# Patient Record
Sex: MALE | Race: WHITE | Employment: FULL TIME | URBAN - METROPOLITAN AREA
[De-identification: names, ages, dates, MRNs, and addresses within clinical notes are randomized per-mention and may not be internally consistent; named-entity substitution may affect disease eponyms.]

---

## 2017-09-18 ENCOUNTER — OFFICE VISIT (OUTPATIENT)
Dept: INTERNAL MEDICINE CLINIC | Age: 62
End: 2017-09-18

## 2017-09-18 VITALS
SYSTOLIC BLOOD PRESSURE: 138 MMHG | BODY MASS INDEX: 25.01 KG/M2 | HEIGHT: 68 IN | HEART RATE: 64 BPM | OXYGEN SATURATION: 96 % | WEIGHT: 165 LBS | TEMPERATURE: 98.1 F | DIASTOLIC BLOOD PRESSURE: 80 MMHG

## 2017-09-18 DIAGNOSIS — E78.5 DYSLIPIDEMIA: ICD-10-CM

## 2017-09-18 DIAGNOSIS — M25.50 POLYARTHRALGIA: Primary | ICD-10-CM

## 2017-09-18 LAB
ALBUMIN SERPL-MCNC: 3.8 G/DL (ref 3.9–5.4)
ALKALINE PHOS POC: 129 U/L (ref 38–126)
ALT SERPL-CCNC: 24 U/L (ref 9–52)
AST SERPL-CCNC: 19 U/L (ref 14–36)
BUN BLD-MCNC: 18 MG/DL (ref 9–20)
CALCIUM BLD-MCNC: 9.4 MG/DL (ref 8.4–10.2)
CHLORIDE BLD-SCNC: 103 MMOL/L (ref 98–107)
CK (CPK) POC: 52 U/L (ref 30–135)
CO2 POC: 27 MMOL/L (ref 22–32)
CREAT BLD-MCNC: 0.8 MG/DL (ref 0.8–1.5)
EGFR (POC): 94.4
ERYTHROCYTE [SEDIMENTATION RATE] IN BLOOD: 35 MM/HR (ref 0–10)
GLUCOSE POC: 89 MG/DL (ref 75–110)
POTASSIUM SERPL-SCNC: 4.5 MMOL/L (ref 3.6–5)
PROT SERPL-MCNC: 6.6 G/DL (ref 6.3–8.2)
SODIUM SERPL-SCNC: 142 MMOL/L (ref 137–145)
TOTAL BILIRUBIN POC: 0.6 MG/DL (ref 0.2–1.3)

## 2017-09-18 NOTE — PROGRESS NOTES
1. Have you been to the ER, urgent care clinic since your last visit? Hospitalized since your last visit? No    2. Have you seen or consulted any other health care providers outside of the [address] [place] [address] since your last visit? Include any pap smears or colon screening.  No

## 2017-09-18 NOTE — PROGRESS NOTES
Johnathan Taylor comes to the office today complaining of about 3 1/2 weeks of severe pain in both shoulders and both hips, seems to be worse at night and keeps him awake. He says if he takes a hot shower and takes an Oxycodone he got five years ago with dental surgery then he gets relief. It doesn't limit him in any way during the day. Medications:  1. Plavix 75 mg daily. 2. Motrin 400 mg as needed. 3. Crestor 10 mg daily. 4. Flomax 0.4 mg daily. Physical Examination:  GENERAL:  T: 98.1. BP: 138/80. P: 64 and regular. WT: 165. MUSCULOSKELETAL:  He has good motion at the shoulders. He does have more discomfort at the right shoulder than the left, but full ROM. There is no discomfort with palpation over the trochanteric bursa or with rotation at the hip joint. Impression:  1. Symmetric polyarthralgia, etiology undetermined. Plan:  1. Discontinue Crestor temporarily. 2. Check CMP, CPK, sed rate, CRP and RA latex. I'll call with results, make further recommendations and arrange follow up if necessary.

## 2017-09-19 LAB
CRP SERPL-MCNC: 31 MG/L (ref 0–4.9)
RHEUMATOID FACT SERPL-ACNC: <10 IU/ML (ref 0–13.9)

## 2017-09-20 DIAGNOSIS — M35.3 PMR (POLYMYALGIA RHEUMATICA) (HCC): Primary | ICD-10-CM

## 2017-09-20 RX ORDER — PREDNISONE 10 MG/1
10 TABLET ORAL SEE ADMIN INSTRUCTIONS
Qty: 21 TAB | Refills: 0 | Status: SHIPPED | OUTPATIENT
Start: 2017-09-20 | End: 2017-10-03

## 2017-09-20 NOTE — PROGRESS NOTES
cpk & lfts nl-resume crestor    Crp=31;esr=35    REC:prednisone dose pack;report first of next week regarding symptoms

## 2017-09-27 RX ORDER — PREDNISONE 10 MG/1
TABLET ORAL
Qty: 60 TAB | Refills: 0 | Status: SHIPPED | OUTPATIENT
Start: 2017-09-27 | End: 2017-10-23 | Stop reason: SDUPTHER

## 2017-09-27 NOTE — TELEPHONE ENCOUNTER
Requested Prescriptions     Pending Prescriptions Disp Refills    predniSONE (DELTASONE) 10 mg tablet 60 Tab 0     Sig: Take 2 tablets

## 2017-10-03 PROBLEM — J44.9 COPD (CHRONIC OBSTRUCTIVE PULMONARY DISEASE) (HCC): Status: ACTIVE | Noted: 2017-10-03

## 2017-10-03 PROBLEM — I10 HYPERTENSION: Status: ACTIVE | Noted: 2017-10-03

## 2017-10-03 PROBLEM — N52.9 ED (ERECTILE DYSFUNCTION): Status: ACTIVE | Noted: 2017-10-03

## 2017-10-03 PROBLEM — B00.1 FEVER BLISTER: Status: ACTIVE | Noted: 2017-10-03

## 2017-10-03 PROBLEM — Z87.442 HISTORY OF KIDNEY STONES: Status: ACTIVE | Noted: 2017-10-03

## 2017-10-03 PROBLEM — I25.10 CAD, MULTIPLE VESSEL: Status: ACTIVE | Noted: 2017-10-03

## 2017-10-03 PROBLEM — Z86.73 HISTORY OF CVA (CEREBROVASCULAR ACCIDENT): Status: ACTIVE | Noted: 2017-10-03

## 2017-10-03 PROBLEM — Z00.00 PE (PHYSICAL EXAM), ANNUAL: Status: ACTIVE | Noted: 2017-10-03

## 2017-10-03 PROBLEM — Z72.0 TOBACCO ABUSE DISORDER: Status: ACTIVE | Noted: 2017-10-03

## 2017-10-03 PROBLEM — E55.9 VITAMIN D DEFICIENCY: Status: ACTIVE | Noted: 2017-10-03

## 2017-10-03 RX ORDER — ASPIRIN 81 MG/1
81 TABLET ORAL DAILY
COMMUNITY

## 2017-10-03 RX ORDER — ROSUVASTATIN CALCIUM 40 MG/1
40 TABLET, COATED ORAL
COMMUNITY
End: 2018-03-10 | Stop reason: SDUPTHER

## 2017-10-03 RX ORDER — METOPROLOL SUCCINATE 50 MG/1
50 TABLET, EXTENDED RELEASE ORAL DAILY
COMMUNITY
End: 2018-04-17 | Stop reason: SDUPTHER

## 2017-10-03 RX ORDER — SILDENAFIL CITRATE 20 MG/1
40-100 TABLET ORAL
COMMUNITY
End: 2017-10-16 | Stop reason: SDUPTHER

## 2017-10-03 RX ORDER — VALACYCLOVIR HYDROCHLORIDE 1 G/1
1000 TABLET, FILM COATED ORAL
COMMUNITY
End: 2020-08-05 | Stop reason: SDUPTHER

## 2017-10-03 RX ORDER — GLUCOSAMINE SULFATE 1500 MG
1000 POWDER IN PACKET (EA) ORAL DAILY
COMMUNITY

## 2017-10-04 ENCOUNTER — OFFICE VISIT (OUTPATIENT)
Dept: INTERNAL MEDICINE CLINIC | Age: 62
End: 2017-10-04

## 2017-10-04 VITALS
HEIGHT: 68 IN | RESPIRATION RATE: 20 BRPM | OXYGEN SATURATION: 97 % | TEMPERATURE: 98.5 F | SYSTOLIC BLOOD PRESSURE: 134 MMHG | BODY MASS INDEX: 25.16 KG/M2 | WEIGHT: 166 LBS | HEART RATE: 54 BPM | DIASTOLIC BLOOD PRESSURE: 77 MMHG

## 2017-10-04 DIAGNOSIS — M35.3 PMR (POLYMYALGIA RHEUMATICA) (HCC): ICD-10-CM

## 2017-10-04 DIAGNOSIS — Z23 ENCOUNTER FOR IMMUNIZATION: Primary | ICD-10-CM

## 2017-10-04 LAB — ERYTHROCYTE [SEDIMENTATION RATE] IN BLOOD: 2 MM/HR (ref 0–10)

## 2017-10-04 NOTE — PROGRESS NOTES
Subjective:   Tessa Sinha is a 57 y.o. male      Chief Complaint   Patient presents with    Joint Pain    Immunization/Injection        History of present illness:  Mr. Saravanan Collins returns in follow up. While I was on vacation he saw Dr. Jonh Oakley with severe girdle myalgias. He tried him on a prednisone dosepack with dramatic improvement and when he went off the dose pack his symptoms returned, so we started 20 mg prednisone a day and he is feeling fine now. His sed rate was 35, but his C-reactive protein was significantly elevated prior to beginning the prednisone and he almost certainly has polymyalgia. We had a long discussion about what that means and the treatment and potential side effects, including issues with his hips, blood sugar, bone and weight gain, etc.  He feels so good he certainly is willing to go forward with the treatment. We will check him back here in six weeks time fasting. In the meantime I think he should remain on the 20 mg of prednisone daily until then.         Patient Active Problem List    Diagnosis Date Noted    PMR (polymyalgia rheumatica) ([facility] 10/04/2017     Priority: 1 - One    CAD, multiple vessel 10/03/2017     Priority: 1 - One    Hypertension 10/03/2017     Priority: 1 - One    Dyslipidemia 09/18/2017     Priority: 1 - One    COPD (chronic obstructive pulmonary disease) ([facility] 10/03/2017     Priority: 2 - Two    Tobacco abuse disorder 10/03/2017     Priority: 3 - Three    History of CVA (cerebrovascular accident) 10/03/2017     Priority: 4 - Four    ED (erectile dysfunction) 10/03/2017     Priority: 4 - Four    Fever blister 10/03/2017     Priority: 4 - Four    History of kidney stones 10/03/2017     Priority: 4 - Four    Vitamin D deficiency 10/03/2017     Priority: 5 - Five    PE (physical exam), annual 10/03/2017    Polyarthralgia 09/18/2017      Past Surgical History:   Procedure Laterality Date    HX CORONARY STENT PLACEMENT      HX KNEE ARTHROSCOPY Bilateral Allergies   Allergen Reactions    Pcn [Penicillins] Unknown (comments)      Family History   Problem Relation Age of Onset    Heart Disease Mother     Stroke Mother     Heart Disease Father     High Cholesterol Father     High Cholesterol Sister     High Cholesterol Brother       Social History     Social History    Marital status: UNKNOWN     Spouse name: N/A    Number of children: N/A    Years of education: N/A     Occupational History    Not on file. Social History Main Topics    Smoking status: Current Every Day Smoker     Packs/day: 0.50    Smokeless tobacco: Never Used    Alcohol use 8.4 oz/week     14 Standard drinks or equivalent per week    Drug use: Not on file    Sexual activity: Not on file     Other Topics Concern    Not on file     Social History Narrative     Outpatient Prescriptions Marked as Taking for the 10/4/17 encounter (Office Visit) with Do Lomeli MD   Medication Sig Dispense Refill    aspirin delayed-release 81 mg tablet Take 81 mg by mouth daily.  rosuvastatin (CRESTOR) 40 mg tablet Take 40 mg by mouth nightly.  metoprolol succinate (TOPROL-XL) 50 mg XL tablet Take 50 mg by mouth daily.  sildenafil (REVATIO) 20 mg tablet Take  mg by mouth daily as needed.  cholecalciferol (VITAMIN D3) 1,000 unit cap Take 1,000 Units by mouth daily.  valACYclovir (VALTREX) 1 gram tablet Take 1,000 mg by mouth. Take 2 with onset fever blister then 2 12 hrs later      predniSONE (DELTASONE) 10 mg tablet Take 2 tablets daily 60 Tab 0    clopidogrel (PLAVIX) 75 mg tablet Take 75 mg by mouth daily. Review of Systems              Constitutional:  He denies fever, weight loss, sweats or fatigue. HEENT:  No blurred or double vision, headache or dizziness. No difficulty with swallowing, taste, speech or smell. Respiratory:  No cough, wheezing or shortness of breath. No sputum production.   Cardiac:  Denies chest pain, palpitations, unexplained indigestion, syncope, edema, PND or orthopnea. GI:  No changes in bowel movements, no abdominal pain, no bloating, anorexia, nausea, vomiting or heartburn. :  No frequency or dysuria. Denies incontinence. Extremities:  No joint pain, stiffness or swelling. Skin:  No recent rashes or mole changes. Neurological:  No numbness, tingling, burning paresthesias or loss of motor strength. No syncope, dizziness, frequent headaches or memory loss. Objective:     Vitals:    10/04/17 1337   BP: 134/77   Pulse: (!) 54   Resp: 20   Temp: 98.5 °F (36.9 °C)   TempSrc: Oral   SpO2: 97%   Weight: 166 lb (75.3 kg)   Height: 5' 8\" (1.727 m)   PainSc:   0 - No pain       Body mass index is 25.24 kg/(m^2). Physical Examination:              General Appearance:  Well-developed, well-nourished, no acute  distress. HEENT:      Ears:  The TMs and ear canals were clear. Eyes:  The pupillary responses were normal.  Extraocular muscle function intact. Lids and conjunctiva not injected. Neck:  Supple without thyromegaly or adenopathy. No JVD noted. Lungs:  Clear to auscultation and percussion. Cardiac:  Regular rate and rhythm without murmur. GI: nontender w/o mass. Normal BS's. Extremities:  No clubbing, cyanosis or edema. Skin:  No rash or unusual mole changes noted. Neurological:  Grossly normal.            Assessment/Plan:   Impressions:      ICD-10-CM ICD-9-CM    1. Encounter for immunization Z23 V03.89 INFLUENZA VIRUS VAC QUAD,SPLIT,PRESV FREE SYRINGE IM      MT IMMUNIZ ADMIN,1 SINGLE/COMB VAC/TOXOID   2. PMR (polymyalgia rheumatica) ([facility]) M35.3 725 AMB POC SEDIMENTATION RATE, ERYTHROCYTE; NON AUTO      C REACTIVE PROTEIN, QT        Plan:  1. Continue present meds  2. Lifestyle modifications including Na restriction, low carb/fat diet, weight reduction and exercise (at least a walking program).         Follow-up Disposition:  Return in about 6 weeks (around 11/15/2017) for Fasting, lipids, BP check.       Orders Placed This Encounter    Influenza virus vaccine (QUADRIVALENT PRES FREE SYRINGE) IM ([id number])    C REACTIVE PROTEIN, QT    AMB POC SEDIMENTATION RATE, ERYTHROCYTE; NON AUTO    NM IMMUNIZ ADMIN,1 SINGLE/COMB VAC/TOXOID       Do Lomeli MD

## 2017-10-05 LAB — CRP SERPL-MCNC: 3.1 MG/L (ref 0–4.9)

## 2017-10-16 RX ORDER — SILDENAFIL CITRATE 20 MG/1
TABLET ORAL
Qty: 50 TAB | Refills: 0 | Status: SHIPPED | OUTPATIENT
Start: 2017-10-16 | End: 2018-03-21

## 2017-10-23 RX ORDER — PREDNISONE 10 MG/1
TABLET ORAL
Qty: 60 TAB | Refills: 2 | Status: SHIPPED | OUTPATIENT
Start: 2017-10-23 | End: 2018-01-18

## 2017-10-23 RX ORDER — PREDNISONE 10 MG/1
TABLET ORAL
Qty: 60 TAB | Refills: 0 | Status: SHIPPED | OUTPATIENT
Start: 2017-10-23 | End: 2017-11-13

## 2017-10-23 NOTE — TELEPHONE ENCOUNTER
Requested Prescriptions     Pending Prescriptions Disp Refills    predniSONE (DELTASONE) 10 mg tablet 60 Tab 0     Sig: Take 2 tablets daily

## 2017-11-15 ENCOUNTER — OFFICE VISIT (OUTPATIENT)
Dept: INTERNAL MEDICINE CLINIC | Age: 62
End: 2017-11-15

## 2017-11-15 VITALS
DIASTOLIC BLOOD PRESSURE: 73 MMHG | WEIGHT: 168 LBS | HEART RATE: 56 BPM | OXYGEN SATURATION: 96 % | SYSTOLIC BLOOD PRESSURE: 128 MMHG | HEIGHT: 68 IN | BODY MASS INDEX: 25.46 KG/M2

## 2017-11-15 DIAGNOSIS — N52.9 ERECTILE DYSFUNCTION, UNSPECIFIED ERECTILE DYSFUNCTION TYPE: ICD-10-CM

## 2017-11-15 DIAGNOSIS — M35.3 PMR (POLYMYALGIA RHEUMATICA) (HCC): Primary | ICD-10-CM

## 2017-11-15 DIAGNOSIS — I10 ESSENTIAL HYPERTENSION: ICD-10-CM

## 2017-11-15 LAB
BUN BLD-MCNC: 18 MG/DL (ref 9–20)
CALCIUM BLD-MCNC: 10.4 MG/DL (ref 8.4–10.2)
CHLORIDE BLD-SCNC: 104 MMOL/L (ref 98–107)
CO2 POC: 30 MMOL/L (ref 22–32)
CREAT BLD-MCNC: 1 MG/DL (ref 0.8–1.5)
EGFR (POC): 79.2
ERYTHROCYTE [SEDIMENTATION RATE] IN BLOOD: 6 MM/HR (ref 0–10)
GLUCOSE POC: 122 MG/DL (ref 75–110)
POTASSIUM SERPL-SCNC: 4.7 MMOL/L (ref 3.6–5)
SODIUM SERPL-SCNC: 144 MMOL/L (ref 137–145)

## 2017-11-15 RX ORDER — TADALAFIL 20 MG/1
20 TABLET ORAL AS NEEDED
Qty: 20 TAB | Refills: 3 | Status: SHIPPED | OUTPATIENT
Start: 2017-11-15 | End: 2018-03-21

## 2017-11-15 NOTE — PROGRESS NOTES
Subjective:   Fuad Ocasio is a 57 y.o. male      Chief Complaint   Patient presents with    Follow-up     6 wk        History of present illness:  Linda Wright returns in follow up. He continues to feel wonderful. He has more energy. He has virtually no aches or pains. He is still on 20 mg of prednisone and hopefully sed rate and CRP remain normal and we can reduce that medication. He'd like to reduce to 10 mg. That might be a little bit quick, but I don't see any problem with doing that provided his labs are okay. He denies new cardiorespiratory, GI or  symptoms. He'll follow up in two months time.         Patient Active Problem List    Diagnosis Date Noted    PMR (polymyalgia rheumatica) ([facility] 10/04/2017     Priority: 1 - One    CAD, multiple vessel 10/03/2017     Priority: 1 - One    Hypertension 10/03/2017     Priority: 1 - One    Dyslipidemia 09/18/2017     Priority: 1 - One    COPD (chronic obstructive pulmonary disease) ([facility] 10/03/2017     Priority: 2 - Two    Tobacco abuse disorder 10/03/2017     Priority: 3 - Three    History of CVA (cerebrovascular accident) 10/03/2017     Priority: 4 - Four    ED (erectile dysfunction) 10/03/2017     Priority: 4 - Four    Fever blister 10/03/2017     Priority: 4 - Four    History of kidney stones 10/03/2017     Priority: 4 - Four    Vitamin D deficiency 10/03/2017     Priority: 5 - Five    PE (physical exam), annual 10/03/2017    Polyarthralgia 09/18/2017      Past Surgical History:   Procedure Laterality Date    HX CORONARY STENT PLACEMENT      HX KNEE ARTHROSCOPY Bilateral       Allergies   Allergen Reactions    Pcn [Penicillins] Unknown (comments)      Family History   Problem Relation Age of Onset    Heart Disease Mother     Stroke Mother     Heart Disease Father     High Cholesterol Father     High Cholesterol Sister     High Cholesterol Brother       Social History     Social History    Marital status: UNKNOWN     Spouse name: N/A    Number of children: N/A    Years of education: N/A     Occupational History    Not on file. Social History Main Topics    Smoking status: Current Every Day Smoker     Packs/day: 0.50    Smokeless tobacco: Never Used    Alcohol use 8.4 oz/week     14 Standard drinks or equivalent per week    Drug use: Not on file    Sexual activity: Not on file     Other Topics Concern    Not on file     Social History Narrative     Outpatient Prescriptions Marked as Taking for the 11/15/17 encounter (Office Visit) with Tasha Rebolledo MD   Medication Sig Dispense Refill    tadalafil (CIALIS) 20 mg tablet Take 1 Tab by mouth as needed. 20 Tab 3    predniSONE (DELTASONE) 10 mg tablet Take 2 tablets daily (Patient taking differently: 10 mg daily. Take 2 tablets daily) 60 Tab 2    sildenafil (REVATIO) 20 mg tablet TAKE 2-5 TABLETS BY MOUTH AS NEEDED FOR SEXUAL ACTIVITY 50 Tab 0    aspirin delayed-release 81 mg tablet Take 81 mg by mouth daily.  rosuvastatin (CRESTOR) 40 mg tablet Take 40 mg by mouth nightly.  metoprolol succinate (TOPROL-XL) 50 mg XL tablet Take 50 mg by mouth daily.  cholecalciferol (VITAMIN D3) 1,000 unit cap Take 1,000 Units by mouth daily.  valACYclovir (VALTREX) 1 gram tablet Take 1,000 mg by mouth. Take 2 with onset fever blister then 2 12 hrs later      clopidogrel (PLAVIX) 75 mg tablet Take 75 mg by mouth daily. Review of Systems              Constitutional:  He denies fever, weight loss, sweats or fatigue. HEENT:  No blurred or double vision, headache or dizziness. No difficulty with swallowing, taste, speech or smell. Respiratory:  No cough, wheezing or shortness of breath. No sputum production. Cardiac:  Denies chest pain, palpitations, unexplained indigestion, syncope, edema, PND or orthopnea. GI:  No changes in bowel movements, no abdominal pain, no bloating, anorexia, nausea, vomiting or heartburn. :  No frequency or dysuria.   Denies incontinence. Extremities:  No joint pain, stiffness or swelling. Skin:  No recent rashes or mole changes. Neurological:  No numbness, tingling, burning paresthesias or loss of motor strength. No syncope, dizziness, frequent headaches or memory loss. Objective:     Vitals:    11/15/17 0913   BP: 128/73   Pulse: (!) 56   SpO2: 96%   Weight: 168 lb (76.2 kg)   Height: 5' 8\" (1.727 m)       Body mass index is 25.54 kg/(m^2). Physical Examination:              General Appearance:  Well-developed, well-nourished, no acute  distress. HEENT:      Ears:  The TMs and ear canals were clear. Eyes:  The pupillary responses were normal.  Extraocular muscle function intact. Lids and conjunctiva not injected. Neck:  Supple without thyromegaly or adenopathy. No JVD noted. Lungs:  Clear to auscultation and percussion. Cardiac:  Regular rate and rhythm without murmur. GI: nontender w/o mass. Normal BS's. Extremities:  No clubbing, cyanosis or edema. Skin:  No rash or unusual mole changes noted. Neurological:  Grossly normal.            Assessment/Plan:   Impressions:      ICD-10-CM ICD-9-CM    1. PMR (polymyalgia rheumatica) ([facility]) M35.3 725 AMB POC BASIC METABOLIC PANEL      AMB POC SEDIMENTATION RATE, ERYTHROCYTE; NON AUTO      CRP, HIGH SENSITIVITY   2. Essential hypertension I10 401.9 AMB POC BASIC METABOLIC PANEL   3. Erectile dysfunction, unspecified erectile dysfunction type N52.9 607.84         Plan:  1. Continue present meds  2. Lifestyle modifications including Na restriction, low carb/fat diet, weight reduction and exercise (at least a walking program). Follow-up Disposition:  Return in about 2 months (around 1/15/2018). Orders Placed This Encounter    CRP, HIGH SENSITIVITY    AMB POC BASIC METABOLIC PANEL    AMB POC SEDIMENTATION RATE, ERYTHROCYTE; NON AUTO    tadalafil (CIALIS) 20 mg tablet     Sig: Take 1 Tab by mouth as needed.      Dispense:  20 Tab     Refill:  3 Cindy Amaya MD

## 2017-11-15 NOTE — PROGRESS NOTES
Reviewed record in preparation for visit and have obtained necessary documentation. Identified pt with two pt identifiers(name and ). Chief Complaint   Patient presents with    Follow-up     6 wk        Coordination of Care Questionnaire:  :     1) Have you been to an emergency room, urgent care clinic since your last visit? No    Hospitalized since your last visit? No  :          2) Have you seen or consulted any other health care providers outside of [address] [place] [address] since your last visit?  No

## 2017-11-16 LAB — CRP SERPL HS-MCNC: 14.04 MG/L (ref 0–3)

## 2017-11-16 NOTE — PROGRESS NOTES
1 of the 2 markers for inflammation is still elevated. He may get more symptoms with the reduction in prednisone we discussed.   If he does he needs to call us

## 2017-11-16 NOTE — PROGRESS NOTES
Patient informed of lab results and states has decreased the Prednisone to one tablet daily and is doing okay so far. He will call the office if he gets more symptoms.

## 2017-11-19 RX ORDER — CLOPIDOGREL BISULFATE 75 MG/1
TABLET ORAL
Qty: 90 TAB | Refills: 3 | Status: SHIPPED | OUTPATIENT
Start: 2017-11-19 | End: 2019-01-24 | Stop reason: SDUPTHER

## 2017-12-22 RX ORDER — PREDNISONE 10 MG/1
TABLET ORAL
Qty: 60 TAB | Refills: 0 | Status: SHIPPED | OUTPATIENT
Start: 2017-12-22 | End: 2018-01-18

## 2018-01-18 ENCOUNTER — OFFICE VISIT (OUTPATIENT)
Dept: INTERNAL MEDICINE CLINIC | Age: 63
End: 2018-01-18

## 2018-01-18 VITALS
HEART RATE: 53 BPM | TEMPERATURE: 98.2 F | BODY MASS INDEX: 26.37 KG/M2 | OXYGEN SATURATION: 98 % | SYSTOLIC BLOOD PRESSURE: 124 MMHG | HEIGHT: 68 IN | DIASTOLIC BLOOD PRESSURE: 76 MMHG | WEIGHT: 174 LBS | RESPIRATION RATE: 18 BRPM

## 2018-01-18 DIAGNOSIS — I10 ESSENTIAL HYPERTENSION: ICD-10-CM

## 2018-01-18 DIAGNOSIS — M35.3 PMR (POLYMYALGIA RHEUMATICA) (HCC): Primary | ICD-10-CM

## 2018-01-18 DIAGNOSIS — J43.9 PULMONARY EMPHYSEMA, UNSPECIFIED EMPHYSEMA TYPE (HCC): ICD-10-CM

## 2018-01-18 DIAGNOSIS — R73.02 IGT (IMPAIRED GLUCOSE TOLERANCE): ICD-10-CM

## 2018-01-18 LAB
BUN BLD-MCNC: 18 MG/DL (ref 9–20)
CALCIUM BLD-MCNC: 9.9 MG/DL (ref 8.4–10.2)
CHLORIDE BLD-SCNC: 104 MMOL/L (ref 98–107)
CO2 POC: 30 MMOL/L (ref 22–32)
CREAT BLD-MCNC: 1 MG/DL (ref 0.8–1.5)
EGFR (POC): 79.2
ERYTHROCYTE [SEDIMENTATION RATE] IN BLOOD: 8 MM/HR (ref 0–10)
GLUCOSE POC: 153 MG/DL (ref 75–110)
HBA1C MFR BLD HPLC: 5.8 % (ref 4.5–5.7)
POTASSIUM SERPL-SCNC: 4.5 MMOL/L (ref 3.6–5)
SODIUM SERPL-SCNC: 145 MMOL/L (ref 137–145)

## 2018-01-18 RX ORDER — PREDNISONE 10 MG/1
TABLET ORAL
COMMUNITY
End: 2018-05-03 | Stop reason: SDUPTHER

## 2018-01-18 NOTE — PROGRESS NOTES
Subjective:   Jean-Pierre Mohan is a 57 y.o. male      Chief Complaint   Patient presents with    Follow-up     not fasting- blood work        History of present illness:  Amy Dumont returns in follow up. He still notes some vague muscle discomfort, particularly if he is late in taking his prednisone. We did reduce it significantly last visit due to his CRPs being somewhat elevated. We talked about re-measuring the CRP and sed rate today and if they are stable continuing with the 10 mg. If they are improved we could reduce it slightly. If they are worse we will probably have to increase it back up for a short period of time. His blood sugars have also been marginal on the steroids and we will follow up regarding his impaired glucose tolerance today. Blood pressure is excellent today. He denies new cardiorespiratory, GI or  symptoms. He is due in March for his comprehensive examination.         Patient Active Problem List    Diagnosis Date Noted    PMR (polymyalgia rheumatica) ([facility] 10/04/2017     Priority: 1 - One    CAD, multiple vessel 10/03/2017     Priority: 1 - One    Hypertension 10/03/2017     Priority: 1 - One    Dyslipidemia 09/18/2017     Priority: 1 - One    COPD (chronic obstructive pulmonary disease) ([facility] 10/03/2017     Priority: 2 - Two    Tobacco abuse disorder 10/03/2017     Priority: 3 - Three    History of CVA (cerebrovascular accident) 10/03/2017     Priority: 4 - Four    ED (erectile dysfunction) 10/03/2017     Priority: 4 - Four    Fever blister 10/03/2017     Priority: 4 - Four    History of kidney stones 10/03/2017     Priority: 4 - Four    Vitamin D deficiency 10/03/2017     Priority: 5 - Five    IGT (impaired glucose tolerance) 01/18/2018    PE (physical exam), annual 10/03/2017    Polyarthralgia 09/18/2017      Past Surgical History:   Procedure Laterality Date    HX CORONARY STENT PLACEMENT      HX KNEE ARTHROSCOPY Bilateral       Allergies   Allergen Reactions    Pcn [Penicillins] Unknown (comments)      Family History   Problem Relation Age of Onset    Heart Disease Mother     Stroke Mother     Heart Disease Father     High Cholesterol Father     High Cholesterol Sister     High Cholesterol Brother       Social History     Social History    Marital status: UNKNOWN     Spouse name: N/A    Number of children: N/A    Years of education: N/A     Occupational History    Not on file. Social History Main Topics    Smoking status: Current Every Day Smoker     Packs/day: 0.50    Smokeless tobacco: Never Used    Alcohol use 8.4 oz/week     14 Standard drinks or equivalent per week    Drug use: Not on file    Sexual activity: Not on file     Other Topics Concern    Not on file     Social History Narrative     Outpatient Prescriptions Marked as Taking for the 1/18/18 encounter (Office Visit) with Castro Dorado MD   Medication Sig Dispense Refill    predniSONE (DELTASONE) 10 mg tablet Take  by mouth daily (with breakfast).  clopidogrel (PLAVIX) 75 mg tab TAKE 1 TABLET BY MOUTH DAILY 90 Tab 3    tadalafil (CIALIS) 20 mg tablet Take 1 Tab by mouth as needed. 20 Tab 3    sildenafil (REVATIO) 20 mg tablet TAKE 2-5 TABLETS BY MOUTH AS NEEDED FOR SEXUAL ACTIVITY 50 Tab 0    aspirin delayed-release 81 mg tablet Take 81 mg by mouth daily.  rosuvastatin (CRESTOR) 40 mg tablet Take 40 mg by mouth nightly.  metoprolol succinate (TOPROL-XL) 50 mg XL tablet Take 50 mg by mouth daily.  cholecalciferol (VITAMIN D3) 1,000 unit cap Take 1,000 Units by mouth daily.  valACYclovir (VALTREX) 1 gram tablet Take 1,000 mg by mouth. Take 2 with onset fever blister then 2 12 hrs later          Review of Systems              Constitutional:  He denies fever, weight loss, sweats or fatigue. HEENT:  No blurred or double vision, headache or dizziness. No difficulty with swallowing, taste, speech or smell. Respiratory:  No cough, wheezing or shortness of breath.   No sputum production. Cardiac:  Denies chest pain, palpitations, unexplained indigestion, syncope, edema, PND or orthopnea. GI:  No changes in bowel movements, no abdominal pain, no bloating, anorexia, nausea, vomiting or heartburn. :  No frequency or dysuria. Denies incontinence. Extremities:  No joint pain, stiffness or swelling. Skin:  No recent rashes or mole changes. Neurological:  No numbness, tingling, burning paresthesias or loss of motor strength. No syncope, dizziness, frequent headaches or memory loss. Objective:     Vitals:    01/18/18 0830   BP: 124/76   Pulse: (!) 53   Resp: 18   Temp: 98.2 °F (36.8 °C)   TempSrc: Oral   SpO2: 98%   Weight: 174 lb (78.9 kg)   Height: 5' 8\" (1.727 m)   PainSc:   0 - No pain       Body mass index is 26.46 kg/(m^2). Physical Examination:              General Appearance:  Well-developed, well-nourished, no acute  distress. HEENT:     Ears:  The TMs and ear canals were clear. Eyes:  The pupillary responses were normal.  Extraocular muscle function intact. Lids and conjunctiva not injected. Neck:  Supple without thyromegaly or adenopathy. No JVD noted. Lungs:  Clear to auscultation and percussion. Cardiac:  Regular rate and rhythm without murmur. GI: nontender w/o mass. Normal BS's. Extremities:  No clubbing, cyanosis or edema. Skin:  No rash or unusual mole changes noted. Neurological:  Grossly normal.            Assessment/Plan:   Impressions:      ICD-10-CM ICD-9-CM    1. PMR (polymyalgia rheumatica) ([facility]) M35.3 725 AMB POC SEDIMENTATION RATE, ERYTHROCYTE; NON AUTO      C REACTIVE PROTEIN, QT   2. Pulmonary emphysema, unspecified emphysema type ([facility] J43.9 492.8    3. Essential hypertension I10 401.9 AMB POC BASIC METABOLIC PANEL   4. IGT (impaired glucose tolerance) R73.02 790.22 AMB POC HEMOGLOBIN A1C      AMB POC BASIC METABOLIC PANEL        Plan:  1. Continue present meds  2.  Lifestyle modifications including Na restriction, low carb/fat diet, weight reduction and exercise (at least a walking program). 3. Will call re adjustment prednisone        Follow-up Disposition:  Return in about 6 weeks (around 3/1/2018) for CPE.       Orders Placed This Encounter    C REACTIVE PROTEIN, QT    AMB POC HEMOGLOBIN A1C    AMB POC SEDIMENTATION RATE, ERYTHROCYTE; NON AUTO    AMB Lion Ball MD

## 2018-01-18 NOTE — PROGRESS NOTES
1. Have you been to the ER, urgent care clinic since your last visit? Hospitalized since your last visit? No    2. Have you seen or consulted any other health care providers outside of the [address] [place] [address] since your last visit? Include any pap smears or colon screening.  No   Chief Complaint   Patient presents with    Follow-up     not fasting- blood work

## 2018-01-22 NOTE — PROGRESS NOTES
ESR and CRP normal.  A1C good so no diabetes. Blood sugar, kidney and K+ normal.  I would continue prednisone 10 mg daily until FU.

## 2018-01-22 NOTE — PROGRESS NOTES
Patient informed that  ESR and CRP normal.  A1C good so no diabetes.  Blood sugar, kidney and K+ normal.  I would continue prednisone 10 mg daily until FU.

## 2018-02-02 RX ORDER — PREDNISONE 10 MG/1
TABLET ORAL
Qty: 60 TAB | Refills: 0 | Status: SHIPPED | OUTPATIENT
Start: 2018-02-02 | End: 2018-03-18

## 2018-03-08 ENCOUNTER — TELEPHONE (OUTPATIENT)
Dept: INTERNAL MEDICINE CLINIC | Age: 63
End: 2018-03-08

## 2018-03-08 NOTE — TELEPHONE ENCOUNTER
Dr Marah Newsome was informed that the patient called stating that he was having a stuffy head,runny nose congestion no fever. Per Dr Marah Newsome patient was advised to try OTC Xyzal, and Nasacort nasal spray.

## 2018-03-11 RX ORDER — ROSUVASTATIN CALCIUM 40 MG/1
TABLET, COATED ORAL
Qty: 90 TAB | Refills: 2 | Status: SHIPPED | OUTPATIENT
Start: 2018-03-11 | End: 2019-01-24 | Stop reason: SDUPTHER

## 2018-03-14 ENCOUNTER — LAB ONLY (OUTPATIENT)
Dept: INTERNAL MEDICINE CLINIC | Age: 63
End: 2018-03-14

## 2018-03-14 DIAGNOSIS — Z00.00 PE (PHYSICAL EXAM), ANNUAL: Primary | ICD-10-CM

## 2018-03-14 DIAGNOSIS — N39.0 URINARY TRACT INFECTION WITHOUT HEMATURIA, SITE UNSPECIFIED: ICD-10-CM

## 2018-03-14 DIAGNOSIS — R73.02 IGT (IMPAIRED GLUCOSE TOLERANCE): ICD-10-CM

## 2018-03-14 DIAGNOSIS — E78.5 DYSLIPIDEMIA: ICD-10-CM

## 2018-03-14 LAB
BACTERIA UA POCT, BACTPOCT: ABNORMAL
BILIRUB UR QL STRIP: NEGATIVE
CASTS UA POCT: ABNORMAL
CLUE CELLS, CLUEPOCT: NEGATIVE
CRYSTALS UA POCT, CRYSPOCT: NEGATIVE
EPITHELIAL CELLS POCT: ABNORMAL
GLUCOSE UR-MCNC: NEGATIVE MG/DL
KETONES P FAST UR STRIP-MCNC: NEGATIVE MG/DL
MUCUS UA POCT, MUCPOCT: ABNORMAL
PH UR STRIP: 5 [PH] (ref 5–7)
PROT UR QL STRIP: NEGATIVE
RBC UA POCT, RBCPOCT: ABNORMAL
SP GR UR STRIP: 1.02 (ref 1.01–1.02)
TRICH UA POCT, TRICHPOC: NEGATIVE
UA UROBILINOGEN AMB POC: NORMAL (ref 0.2–1)
URINALYSIS CLARITY POC: CLEAR
URINALYSIS COLOR POC: YELLOW
URINE BLOOD POC: ABNORMAL
URINE CULT COMMENT, POCT: ABNORMAL
URINE LEUKOCYTES POC: ABNORMAL
URINE NITRITES POC: NEGATIVE
WBC UA POCT, WBCPOCT: ABNORMAL
YEAST UA POCT, YEASTPOC: NEGATIVE

## 2018-03-15 LAB
ALBUMIN SERPL-MCNC: 3.9 G/DL (ref 3.9–5.4)
ALKALINE PHOS POC: 85 U/L (ref 38–126)
ALT SERPL-CCNC: 36 U/L (ref 9–52)
AST SERPL-CCNC: 19 U/L (ref 14–36)
BUN BLD-MCNC: 17 MG/DL (ref 9–20)
CALCIUM BLD-MCNC: 9.9 MG/DL (ref 8.4–10.2)
CHLORIDE BLD-SCNC: 107 MMOL/L (ref 98–107)
CHOLEST SERPL-MCNC: 121 MG/DL (ref 0–200)
CK (CPK) POC: 49 U/L (ref 30–135)
CO2 POC: 32 MMOL/L (ref 22–32)
CREAT BLD-MCNC: 1 MG/DL (ref 0.8–1.5)
EGFR (POC): 79.2
GLUCOSE POC: 103 MG/DL (ref 75–110)
GRAN# POC: 7.4 K/UL (ref 2–7.8)
GRAN% POC: 60.4 % (ref 37–92)
HBA1C MFR BLD HPLC: 6 % (ref 4.5–5.7)
HCT VFR BLD CALC: 45.9 % (ref 37–51)
HDLC SERPL-MCNC: 43 MG/DL (ref 35–130)
HGB BLD-MCNC: 15.2 G/DL (ref 12–18)
IRON POC: 108 UG/DL (ref 49–181)
IRON SATURATION POC: 28 % (ref 15–55)
LDL CHOLESTEROL POC: 49 MG/DL (ref 0–130)
LY# POC: 4.4 K/UL (ref 0.6–4.1)
LY% POC: 36 % (ref 10–58.5)
MCH RBC QN: 31 PG (ref 26–32)
MCHC RBC-ENTMCNC: 33.2 G/DL (ref 30–36)
MCV RBC: 93 FL (ref 80–97)
MID #, POC: 0.3 K/UL (ref 0–1.8)
MID% POC: 3 % (ref 0.1–24)
PLATELET # BLD: 212 K/UL (ref 140–440)
POTASSIUM SERPL-SCNC: 4.8 MMOL/L (ref 3.6–5)
PROT SERPL-MCNC: 6.5 G/DL (ref 6.3–8.2)
PSA SERPL-MCNC: 0.5 NG/ML (ref 0–4)
RBC # BLD: 4.92 M/UL (ref 4.2–6.3)
SODIUM SERPL-SCNC: 144 MMOL/L (ref 137–145)
TCHOL/HDL RATIO (POC): 2.8 (ref 0–4)
TIBC POC: 394 UG/DL (ref 260–462)
TOTAL BILIRUBIN POC: 0.6 MG/DL (ref 0.2–1.3)
TRIGL SERPL-MCNC: 145 MG/DL (ref 0–200)
TSH BLD-ACNC: 2.31 UIU/ML (ref 0.4–4.2)
VITAMIN D POC: 37.1 NG/ML (ref 30–96)
VLDLC SERPL CALC-MCNC: 29 MG/DL
WBC # BLD: 12.1 K/UL (ref 4.1–10.9)

## 2018-03-16 LAB — BACTERIA UR CULT: NO GROWTH

## 2018-03-18 PROBLEM — J30.9 ALLERGIC RHINITIS: Status: ACTIVE | Noted: 2018-03-18

## 2018-03-21 ENCOUNTER — OFFICE VISIT (OUTPATIENT)
Dept: INTERNAL MEDICINE CLINIC | Age: 63
End: 2018-03-21

## 2018-03-21 VITALS
OXYGEN SATURATION: 96 % | HEIGHT: 68 IN | TEMPERATURE: 97.6 F | RESPIRATION RATE: 16 BRPM | BODY MASS INDEX: 25.76 KG/M2 | DIASTOLIC BLOOD PRESSURE: 83 MMHG | WEIGHT: 170 LBS | SYSTOLIC BLOOD PRESSURE: 140 MMHG | HEART RATE: 48 BPM

## 2018-03-21 DIAGNOSIS — Z23 ENCOUNTER FOR IMMUNIZATION: ICD-10-CM

## 2018-03-21 DIAGNOSIS — Z00.00 PE (PHYSICAL EXAM), ANNUAL: Primary | ICD-10-CM

## 2018-03-21 RX ORDER — SILDENAFIL 100 MG/1
100 TABLET, FILM COATED ORAL AS NEEDED
COMMUNITY
End: 2021-12-17 | Stop reason: SDUPTHER

## 2018-03-21 NOTE — PROGRESS NOTES
Chief Complaint   Patient presents with    Complete Physical     1. Have you been to the ER, urgent care clinic since your last visit? Hospitalized since your last visit? No    2. Have you seen or consulted any other health care providers outside of the [address] [place] [address] since your last visit? Include any pap smears or colon screening.  No

## 2018-03-21 NOTE — PROGRESS NOTES
Ms. Tanisha Jones is a 57year old,  male who comes to the office today for annual comprehensive personal healthcare examination. History of Present Illness: This patient has multiple medical problems. These include:  1. OHD, S/P MI January, 2005. He underwent angioplasty and stenting of the LAD coronary artery at that time. His ejection fraction at the time of the heart attack was 45%. Cardiac cath at that time also revealed a residual coronary artery stenosis at 70% and obtuse marginal stenosis at 50%. Stress Cardiolite testing in February, 2005 was negative. His ejection fraction has improved to 65%. He currently denies any symptoms of angina or congestive heart failure. He has declined further stress testing since that time and isn't inclined to because of his lack of symptoms. 2. History of small stroke prior to the MI. He has no residual from this and has no recurrence. He underwent evaluation at that time, including a transesophageal echocardiogram, which was negative except for minimal mitral regurgitation. 3. Chronic tobacco use. Once again we talked about smoking cessation. His PFTs now reveal moderate obstructive lung disease. 4. Mild ED.  5. History of recurrent fever blisters. 6. Dyslipidemia. 7. Moderate COPD as noted above. 8. Hypertension. 9. Vitamin D deficiency. 10. History of kidney stones. 11. History of allergic rhinitis. 12. PMR. This was discovered approximately six months ago and he's now on tapering prednisone. 13. Impaired glucose tolerance, probably related to the prednisone. At the time of the visit he was doing well. He denied new CR, GI or  symptoms. He is still finding it difficult to quit smoking. He was still having issues with ED. We did discuss the need for repeat stress testing, but he again declined at this point. Past Medical History:  Otherwise remarkable for arthroscopic surgery on both knees. Allergies:  Penicillin.     Current Medications:  1. Aspirin 81 mg daily. 2. Metoprolol Succinate 50 mg daily. 3. Plavix 75 mg daily. 4. Crestor 40 mg daily. 5. Prednisone 10 mg daily. 6. Valtrex 1,000 mg, two tablets initially and then two tablets 12 hours later as needed for fever blisters. 7. Vitamin D 1,000 I U daily. 8. Sildenafil 100 mg daily as needed. Social History:  He is a . He drinks alcohol minimally. Diet could clearly be more prudent. He could definitely use an exercise program.  He continues to smoke three quarters of a pack of cigarettes per day. We discussed various strategies for quitting smoking, but he is not interested. Family History:  Father is alive in his early [de-identified] with heart disease, hypertension and hyperlipidemia. His mother  at age 66 with a history of recurrent strokes and hypertension. A brother has hyperlipidemia. Two sisters also have hyperlipidemia. There is no family history of colon cancer, prostate cancer or diabetes. Review of Systems:  CONSTITUTIONAL:  Denies fevers, weight loss, sweats, or fatigue. HEENT:  No blurred or double vision, headaches or dizziness. No difficulty with swallowing, taste, speech or smell. RESPIRATORY:  No cough, wheezing or shortness of breath, or sputum production. CARDIAC:  Denies chest pain, palpitations, unexplained indigestion, syncope, edema, PND or orthopnea. GI:  No changes in bowel habits, abdominal pain, no bloating, anorexia, nausea, vomiting or heartburn. :  Denies frequency, nocturia, stranguria, dysuria. Some issues with mild ED. EXTREMITIES:  No joint pain, stiffness or swelling. No current arthralgias or myalgias. SKIN:  No recent rash or mole changes. NEUROLOGICAL:  No numbness, tingling, burning paresthesias or loss of motor strength. No syncope, dizziness, frequent headaches or memory loss. Physical Examination:  GENERAL:  Well-developed, well-nourished, no acute distress.   VITAL SIGNS:  BP: borderline at 140/83. P: 48.  R: 16.  T: 97.6. O2 sat: 96%. HT: 5'8\". WT: 170 lbs. BMI: 25.9. VISION:  Deferred to ophthalmologist.    HEARING:  Some mild high frequency hearing loss. HEENT:  Ears:  The TMs and ear canals were clear. Eyes:  The pupillary responses were normal.  Extraocular muscle function intact. Lids and conjunctiva not injected. Funduscopic exam revealed sharp disc margins. Pharynx:  Clear with teeth in good repair. No masses were noted. NECK:  Supple without thyromegaly or adenopathy. No JVD noted. No carotid bruits. LUNGS: Clear to auscultation and percussion. CARDIAC:  Regular rate and rhythm. No murmur. PMI not displaced. No gallop, rub or click. ABDOMEN:  Flat, soft, non-tender without palpable organomegaly or mass. No pulsatile mass was felt, and no bruit was heard. Bowel sounds were active. :  Circumcised male. Both testes descended and no masses felt. RECTAL EXAM:  Normal sphincter tone. Prostate is slightly enlarged, firm, but smooth and non tender. No rectal masses felt. Stool brown and heme negative. EXTREMITIES:  No clubbing, cyanosis or edema. PULSES:  Dorsalis pedis and posterior tibial pulses felt without difficulty. SKIN:  No unusual rash or mole changes noted. LYMPH NODES:  None felt in the cervical, supraclavicular, axillary or inguinal region. NEUROLOGICAL:  Cranial nerves II-XII grossly intact. Motor strength 5/5. DTRs 2+ and symmetric. Station and gait normal.     Laboratory:  Hemoglobin is 15.2. White blood count is 12,100. Blood sugar is 103. Liver and kidney function are normal.  Electrolytes are normal.  Iron is 108. A1c 6.0. PSA is 0.5. TSH is 2.31. Urinalysis is clear. Vitamin D level is 37.1. Lipid profile reveals a total cholesterol of 121, triglycerides of 145, HDL cholesterol of 43 and an LDL cholesterol of 49.   Sed rate and C-reactive protein to be checked at his follow up, at which point we hope to taper his prednisone further. Health Maintenance Issues and Ancillary Studies:  1. TDAP (pertussis and tetanus) vaccine was given on the day of the visit. 2. Will give Shingrix at his follow up as we should have it in the office by then. 3. Pneumovax 23 (PPSV23) was given in 2004. 4. Seasonal influenza vaccine was given in October, 2017.  5. Colonoscopy was negative in July, 2012, (ten year follow up indicated). 6. Cardiac cath in 2005 is described in the HPI. 7. Transesophageal echocardiogram was negative in January, 2002 except for mild mitral regurgitation. 8. Stress Cardiolite was negative in February, 2005. 9. CT scan of the abdomen and pelvis in October, 2013 revealed a left ureteral stone. 10. CT scan of the thorax in March, 2015 was negative. 11. EKG in the office revealed evidence of the old anteroseptal MI.  12. Pulmonary function studies revealed moderate obstructive lung disease. 13. Health screening assessments were essentially normal.    Impression:  1. OHD, status post MI with subsequent stent of LAD and residual CAD. I still think he needs repeat stress testing and will continue to talk to him about that. 2. History of small stroke, recovery without residual.  3. Mild mitral regurgitation. 4. Moderate COPD. 5. Chronic tobacco use. 6. Mild ED. 7. Dyslipidemia. 8. Recurrent fever blisters. 9. Impaired glucose tolerance. 10. PMR. 11. Vitamin D deficiency. 12. History of kidney stones. 13. S/P arthroscopic surgery on the knees. Plan:  1. Continue present medications. 2. Tdap given on the day of the visit. 3. Recheck six weeks time. Will try to give Shingrix and check his sed rate and CRP and hopefully taper prednisone further. 4. Quit smoking and strategies discussed. 5. Will talk about stress testing again at follow up. 6. He is appropriately treated for his ten year coronary heart disease risk at this point.

## 2018-04-17 RX ORDER — METOPROLOL SUCCINATE 50 MG/1
TABLET, EXTENDED RELEASE ORAL
Qty: 90 TAB | Refills: 2 | Status: SHIPPED | OUTPATIENT
Start: 2018-04-17 | End: 2019-01-24 | Stop reason: SDUPTHER

## 2018-05-02 ENCOUNTER — OFFICE VISIT (OUTPATIENT)
Dept: INTERNAL MEDICINE CLINIC | Age: 63
End: 2018-05-02

## 2018-05-02 VITALS
TEMPERATURE: 98.2 F | SYSTOLIC BLOOD PRESSURE: 120 MMHG | OXYGEN SATURATION: 96 % | HEART RATE: 60 BPM | BODY MASS INDEX: 25.7 KG/M2 | DIASTOLIC BLOOD PRESSURE: 78 MMHG | WEIGHT: 169 LBS

## 2018-05-02 DIAGNOSIS — I10 ESSENTIAL HYPERTENSION: ICD-10-CM

## 2018-05-02 DIAGNOSIS — R73.02 IGT (IMPAIRED GLUCOSE TOLERANCE): ICD-10-CM

## 2018-05-02 DIAGNOSIS — M35.3 PMR (POLYMYALGIA RHEUMATICA) (HCC): Primary | ICD-10-CM

## 2018-05-02 LAB — ERYTHROCYTE [SEDIMENTATION RATE] IN BLOOD: 6 MM/HR (ref 0–10)

## 2018-05-02 NOTE — PROGRESS NOTES
Reviewed record in preparation for visit and have obtained necessary documentation. Identified pt with two pt identifiers(name and ). Chief Complaint   Patient presents with    Follow-up        Coordination of Care Questionnaire:  :     1) Have you been to an emergency room, urgent care clinic since your last visit? No     Hospitalized since your last visit? No               2) Have you seen or consulted any other health care providers outside of [address] [place] [address] since your last visit?  No

## 2018-05-02 NOTE — PROGRESS NOTES
Subjective:   Terence Yap is a 57 y.o. male      Chief Complaint   Patient presents with    Follow-up        History of present illness:  Rayna Ceballos returns in follow up. He continues to do well. He has virtually no arthralgias or myalgias. Sometimes if he takes his prednisone a little bit late he might note some minor discomforts, but overall he continues to do well. He's maintained his weight. He's been mindful of carbohydrates, etc.  Denies new CR, GI or  symptoms. It's time to check his sed rate and C-reactive protein again and hopefully we will reduce his prednisone further. He'll follow up in six weeks time.         Patient Active Problem List    Diagnosis Date Noted    PMR (polymyalgia rheumatica) ([facility] 10/04/2017     Priority: 1 - One    CAD, multiple vessel 10/03/2017     Priority: 1 - One    Hypertension 10/03/2017     Priority: 1 - One    Dyslipidemia 09/18/2017     Priority: 1 - One    COPD (chronic obstructive pulmonary disease) ([facility] 10/03/2017     Priority: 2 - Two    IGT (impaired glucose tolerance) 01/18/2018     Priority: 3 - Three    Tobacco abuse disorder 10/03/2017     Priority: 3 - Three    Allergic rhinitis 03/18/2018     Priority: 4 - Four    History of CVA (cerebrovascular accident) 10/03/2017     Priority: 4 - Four    ED (erectile dysfunction) 10/03/2017     Priority: 4 - Four    Fever blister 10/03/2017     Priority: 4 - Four    History of kidney stones 10/03/2017     Priority: 4 - Four    Vitamin D deficiency 10/03/2017     Priority: 5 - Five    PE (physical exam), annual 10/03/2017      Past Surgical History:   Procedure Laterality Date    HX CORONARY STENT PLACEMENT      HX KNEE ARTHROSCOPY Bilateral       Allergies   Allergen Reactions    Pcn [Penicillins] Unknown (comments)      Family History   Problem Relation Age of Onset    Heart Disease Mother     Stroke Mother     Heart Disease Father     High Cholesterol Father     High Cholesterol Sister     High Cholesterol Brother       Social History     Social History    Marital status: UNKNOWN     Spouse name: N/A    Number of children: N/A    Years of education: N/A     Occupational History    Not on file. Social History Main Topics    Smoking status: Current Every Day Smoker     Packs/day: 0.50    Smokeless tobacco: Never Used    Alcohol use 8.4 oz/week     14 Standard drinks or equivalent per week    Drug use: Not on file    Sexual activity: Not on file     Other Topics Concern    Not on file     Social History Narrative     Outpatient Prescriptions Marked as Taking for the 5/2/18 encounter (Office Visit) with Mart Bailon MD   Medication Sig Dispense Refill    metoprolol succinate (TOPROL-XL) 50 mg XL tablet TAKE 1 TABLET BY MOUTH EVERY DAY 90 Tab 2    sildenafil citrate (VIAGRA) 100 mg tablet Take 100 mg by mouth as needed.  rosuvastatin (CRESTOR) 40 mg tablet TAKE 1 TABLET BY MOUTH DAILY 90 Tab 2    predniSONE (DELTASONE) 10 mg tablet Take  by mouth daily (with breakfast).  clopidogrel (PLAVIX) 75 mg tab TAKE 1 TABLET BY MOUTH DAILY 90 Tab 3    aspirin delayed-release 81 mg tablet Take 81 mg by mouth daily.  cholecalciferol (VITAMIN D3) 1,000 unit cap Take 1,000 Units by mouth daily.  valACYclovir (VALTREX) 1 gram tablet Take 1,000 mg by mouth. Take 2 with onset fever blister then 2 12 hrs later          Review of Systems              Constitutional:  He denies fever, weight loss, sweats or fatigue. HEENT:  No blurred or double vision, headache or dizziness. No difficulty with swallowing, taste, speech or smell. Respiratory:  No cough, wheezing or shortness of breath. No sputum production. Cardiac:  Denies chest pain, palpitations, unexplained indigestion, syncope, edema, PND or orthopnea. GI:  No changes in bowel movements, no abdominal pain, no bloating, anorexia, nausea, vomiting or heartburn. :  No frequency or dysuria. Denies incontinence.   Extremities: No joint pain, stiffness or swelling. Skin:  No recent rashes or mole changes. Neurological:  No numbness, tingling, burning paresthesias or loss of motor strength. No syncope, dizziness, frequent headaches or memory loss. Objective:     Vitals:    05/02/18 1046   BP: 120/78   Pulse: 60   Temp: 98.2 °F (36.8 °C)   TempSrc: Oral   SpO2: 96%   Weight: 169 lb (76.7 kg)   PainSc:   0 - No pain       Body mass index is 25.7 kg/(m^2). Physical Examination:              General Appearance:  Well-developed, well-nourished, no acute  distress. HEENT:     Ears:  The TMs and ear canals were clear. Eyes:  The pupillary responses were normal.  Extraocular muscle function intact. Lids and conjunctiva not injected. Neck:  Supple without thyromegaly or adenopathy. No JVD noted. Lungs:  Clear to auscultation and percussion. Cardiac:  Regular rate and rhythm without murmur. GI: nontender w/o mass. Normal BS's. Extremities:  No clubbing, cyanosis or edema. Skin:  No rash or unusual mole changes noted. Neurological:  Grossly normal.            Assessment/Plan:   Impressions:      ICD-10-CM ICD-9-CM    1. PMR (polymyalgia rheumatica) ([facility]) M35.3 725 AMB POC SEDIMENTATION RATE, ERYTHROCYTE; NON AUTO      C REACTIVE PROTEIN, QT   2. Essential hypertension I10 401.9    3. IGT (impaired glucose tolerance) R73.02 790.22         Plan:  1. Continue present meds  2. Discussed lifestyle modifications including Na restriction, low carb/fat diet, weight reduction and exercise (at least a walking program). Follow-up Disposition:  Return in about 6 weeks (around 6/13/2018).       Orders Placed This Encounter    C REACTIVE PROTEIN, QT    AMB POC SEDIMENTATION RATE, ERYTHROCYTE; NON AUTO       Anibal Wells MD

## 2018-05-03 LAB — CRP SERPL-MCNC: 2 MG/L (ref 0–4.9)

## 2018-05-03 RX ORDER — PREDNISONE 5 MG/1
7.5 TABLET ORAL
Qty: 100 TAB | Refills: 2 | Status: SHIPPED | OUTPATIENT
Start: 2018-05-03 | End: 2018-10-16

## 2018-05-03 NOTE — TELEPHONE ENCOUNTER
Per Dr Richelle Box Sed rate and CRP normal.  Decrease prednisone to 7.5 mg daily.  Will need scrip for 5 mg prednisone  # 100

## 2018-05-03 NOTE — PROGRESS NOTES
Sed rate and CRP normal.  Decrease prednisone to 7.5 mg daily.   Will need scrip for 5 mg prednisone  # 100

## 2018-05-03 NOTE — PROGRESS NOTES
Patient informed that Sed rate and CRP normal.  Decrease prednisone to 7.5 mg daily.   Will need scrip for 5 mg prednisone  # 100

## 2018-06-13 ENCOUNTER — OFFICE VISIT (OUTPATIENT)
Dept: INTERNAL MEDICINE CLINIC | Age: 63
End: 2018-06-13

## 2018-06-13 VITALS
OXYGEN SATURATION: 93 % | HEIGHT: 68 IN | RESPIRATION RATE: 16 BRPM | DIASTOLIC BLOOD PRESSURE: 72 MMHG | BODY MASS INDEX: 24.95 KG/M2 | HEART RATE: 56 BPM | SYSTOLIC BLOOD PRESSURE: 116 MMHG | WEIGHT: 164.6 LBS | TEMPERATURE: 97.6 F

## 2018-06-13 DIAGNOSIS — I10 ESSENTIAL HYPERTENSION: ICD-10-CM

## 2018-06-13 DIAGNOSIS — E78.5 DYSLIPIDEMIA: ICD-10-CM

## 2018-06-13 DIAGNOSIS — M35.3 PMR (POLYMYALGIA RHEUMATICA) (HCC): Primary | ICD-10-CM

## 2018-06-13 NOTE — PROGRESS NOTES
Subjective:   Rima Mcneil is a 70 y.o. male      Chief Complaint   Patient presents with    Cholesterol Problem     follow up        History of present illness:  Mr. Ro Pressley returns in. He was supposed to be returning on 7.5 mg of prednisone daily, but when he got the 5 mg tablet he got confused and didn't know how to make the 10 or the 5 mg to 7.5 mg daily dose. Therefore he stayed on 10. There is no sense in doing his blood work again today as his sed rate and CRP were normal last visit on the 10. Instead I've asked him to reduce to 5 mg and skip the 7.5 mg taper. If he has increased symptoms he'll go back up to 7.5. I did explain how he could attain that dose on either dose of prednisone prescribed. He will recheck in six weeks time and we will recheck labs then and hopefully continue to taper. We originally started this around October 1st and are hoping to end it somewhere close to October 1st this year. He denies other new cardiorespiratory, GI or  symptoms.         Patient Active Problem List    Diagnosis Date Noted    PMR (polymyalgia rheumatica) ([facility] 10/04/2017     Priority: 1 - One    CAD, multiple vessel 10/03/2017     Priority: 1 - One    Hypertension 10/03/2017     Priority: 1 - One    Dyslipidemia 09/18/2017     Priority: 1 - One    COPD (chronic obstructive pulmonary disease) ([facility] 10/03/2017     Priority: 2 - Two    IGT (impaired glucose tolerance) 01/18/2018     Priority: 3 - Three    Tobacco abuse disorder 10/03/2017     Priority: 3 - Three    Allergic rhinitis 03/18/2018     Priority: 4 - Four    History of CVA (cerebrovascular accident) 10/03/2017     Priority: 4 - Four    ED (erectile dysfunction) 10/03/2017     Priority: 4 - Four    Fever blister 10/03/2017     Priority: 4 - Four    History of kidney stones 10/03/2017     Priority: 4 - Four    Vitamin D deficiency 10/03/2017     Priority: 5 - Five    PE (physical exam), annual 10/03/2017      Past Surgical History: Procedure Laterality Date    HX CORONARY STENT PLACEMENT      HX KNEE ARTHROSCOPY Bilateral       Allergies   Allergen Reactions    Pcn [Penicillins] Unknown (comments)      Family History   Problem Relation Age of Onset    Heart Disease Mother     Stroke Mother     Heart Disease Father     High Cholesterol Father     High Cholesterol Sister     High Cholesterol Brother       Social History     Social History    Marital status: UNKNOWN     Spouse name: N/A    Number of children: N/A    Years of education: N/A     Occupational History    Not on file. Social History Main Topics    Smoking status: Current Every Day Smoker     Packs/day: 0.50    Smokeless tobacco: Never Used    Alcohol use 8.4 oz/week     14 Standard drinks or equivalent per week    Drug use: Not on file    Sexual activity: Not on file     Other Topics Concern    Not on file     Social History Narrative     Outpatient Prescriptions Marked as Taking for the 6/13/18 encounter (Office Visit) with Tara Fisher MD   Medication Sig Dispense Refill    predniSONE (DELTASONE) 5 mg tablet Take 1.5 Tabs by mouth daily (with breakfast). 100 Tab 2    metoprolol succinate (TOPROL-XL) 50 mg XL tablet TAKE 1 TABLET BY MOUTH EVERY DAY 90 Tab 2    sildenafil citrate (VIAGRA) 100 mg tablet Take 100 mg by mouth as needed.  rosuvastatin (CRESTOR) 40 mg tablet TAKE 1 TABLET BY MOUTH DAILY 90 Tab 2    clopidogrel (PLAVIX) 75 mg tab TAKE 1 TABLET BY MOUTH DAILY 90 Tab 3    aspirin delayed-release 81 mg tablet Take 81 mg by mouth daily.  cholecalciferol (VITAMIN D3) 1,000 unit cap Take 1,000 Units by mouth daily.  valACYclovir (VALTREX) 1 gram tablet Take 1,000 mg by mouth. Take 2 with onset fever blister then 2 12 hrs later          Review of Systems              Constitutional:  He denies fever, weight loss, sweats or fatigue. HEENT:  No blurred or double vision, headache or dizziness.   No difficulty with swallowing, taste, speech or smell. Respiratory:  No cough, wheezing or shortness of breath. No sputum production. Cardiac:  Denies chest pain, palpitations, unexplained indigestion, syncope, edema, PND or orthopnea. GI:  No changes in bowel movements, no abdominal pain, no bloating, anorexia, nausea, vomiting or heartburn. :  No frequency or dysuria. Denies incontinence. Extremities:  No joint pain, stiffness or swelling. Skin:  No recent rashes or mole changes. Neurological:  No numbness, tingling, burning paresthesias or loss of motor strength. No syncope, dizziness, frequent headaches or memory loss. Objective:     Vitals:    06/13/18 0837   BP: 116/72   Pulse: (!) 56   Resp: 16   Temp: 97.6 °F (36.4 °C)   TempSrc: Oral   SpO2: 93%   Weight: 164 lb 9.6 oz (74.7 kg)   Height: 5' 8\" (1.727 m)   PainSc:   0 - No pain       Body mass index is 25.03 kg/(m^2). Physical Examination:              General Appearance:  Well-developed, well-nourished, no acute  distress. HEENT:     Ears:  The TMs and ear canals were clear. Eyes:  The pupillary responses were normal.  Extraocular muscle function intact. Lids and conjunctiva not injected. Neck:  Supple without thyromegaly or adenopathy. No JVD noted. Lungs:  Clear to auscultation and percussion. Cardiac:  Regular rate and rhythm without murmur. GI: nontender w/o mass. Normal BS's. Extremities:  No clubbing, cyanosis or edema. Skin:  No rash or unusual mole changes noted. Neurological:  Grossly normal.            Assessment/Plan:   Impressions:      ICD-10-CM ICD-9-CM    1. PMR (polymyalgia rheumatica) ([facility]) M35.3 725    2. Essential hypertension I10 401.9    3. Dyslipidemia E78.5 272.4         Plan:  1. Continue present meds  2. Discussed lifestyle modifications including Na restriction, low carb/fat diet, weight reduction and exercise (at least a walking program). Follow-up Disposition:  Return in about 6 weeks (around 7/25/2018). No orders of the defined types were placed in this encounter.       Kimberly Romo MD

## 2018-06-13 NOTE — PROGRESS NOTES
Rima Mcneil is a 70 y.o. male  Chief Complaint   Patient presents with    Cholesterol Problem     follow up     Visit Vitals    /72 (BP 1 Location: Left arm, BP Patient Position: Sitting)    Pulse (!) 56    Temp 97.6 °F (36.4 °C) (Oral)    Resp 16    Ht 5' 8\" (1.727 m)    Wt 164 lb 9.6 oz (74.7 kg)    SpO2 93%    BMI 25.03 kg/m2     1. Have you been to the ER, urgent care clinic since your last visit? Hospitalized since your last visit?  no    2. Have you seen or consulted any other health care providers outside of the [address] [place] [address] since your last visit? Include any pap smears or colon screening.    no

## 2018-07-25 RX ORDER — TADALAFIL 20 MG/1
20 TABLET ORAL AS NEEDED
Qty: 20 TAB | Refills: 0 | Status: SHIPPED | OUTPATIENT
Start: 2018-07-25 | End: 2018-10-16 | Stop reason: SDUPTHER

## 2018-10-16 ENCOUNTER — OFFICE VISIT (OUTPATIENT)
Dept: INTERNAL MEDICINE CLINIC | Age: 63
End: 2018-10-16

## 2018-10-16 VITALS
OXYGEN SATURATION: 98 % | TEMPERATURE: 97.8 F | HEART RATE: 52 BPM | DIASTOLIC BLOOD PRESSURE: 78 MMHG | HEIGHT: 68 IN | BODY MASS INDEX: 24.98 KG/M2 | WEIGHT: 164.8 LBS | SYSTOLIC BLOOD PRESSURE: 118 MMHG

## 2018-10-16 DIAGNOSIS — R05.9 COUGH: ICD-10-CM

## 2018-10-16 DIAGNOSIS — M35.3 PMR (POLYMYALGIA RHEUMATICA) (HCC): ICD-10-CM

## 2018-10-16 DIAGNOSIS — N52.9 ERECTILE DYSFUNCTION, UNSPECIFIED ERECTILE DYSFUNCTION TYPE: ICD-10-CM

## 2018-10-16 DIAGNOSIS — Z23 ENCOUNTER FOR IMMUNIZATION: Primary | ICD-10-CM

## 2018-10-16 LAB — ERYTHROCYTE [SEDIMENTATION RATE] IN BLOOD: 4 MM/HR (ref 0–10)

## 2018-10-16 RX ORDER — PREDNISONE 5 MG/1
5 TABLET ORAL DAILY
Qty: 60 TAB | Refills: 0 | Status: SHIPPED | OUTPATIENT
Start: 2018-10-16 | End: 2018-12-06

## 2018-10-16 RX ORDER — TADALAFIL 20 MG/1
20 TABLET ORAL AS NEEDED
Qty: 20 TAB | Refills: 0 | Status: SHIPPED | OUTPATIENT
Start: 2018-10-16 | End: 2019-12-20 | Stop reason: SDUPTHER

## 2018-10-16 RX ORDER — HYDROCODONE POLISTIREX AND CHLORPHENIRAMINE POLISTIREX 10; 8 MG/5ML; MG/5ML
1 SUSPENSION, EXTENDED RELEASE ORAL
Qty: 120 ML | Refills: 0 | Status: SHIPPED | OUTPATIENT
Start: 2018-10-16 | End: 2019-12-20 | Stop reason: SDUPTHER

## 2018-10-16 RX ORDER — LEVOCETIRIZINE DIHYDROCHLORIDE 5 MG/1
5 TABLET, FILM COATED ORAL
COMMUNITY

## 2018-10-16 RX ORDER — PREDNISONE 5 MG/1
5 TABLET ORAL DAILY
COMMUNITY
End: 2018-10-16 | Stop reason: SDUPTHER

## 2018-10-16 NOTE — PROGRESS NOTES
Reviewed record in preparation for visit and have obtained necessary documentation. Identified pt with two pt identifiers(name and ). Chief Complaint Patient presents with  Follow-up Coordination of Care Questionnaire: 
:  
 
1) Have you been to an emergency room, urgent care clinic since your last visit? No  
Hospitalized since your last visit? No  
 
 
     
 
2) Have you seen or consulted any other health care providers outside of [address] [place] [address] since your last visit?  No

## 2018-10-16 NOTE — PROGRESS NOTES
Subjective: Lady Vasquez is a 70 y.o. male Chief Complaint Patient presents with  Follow-up History of present illness:  Mr. Vasquez Silva returns in follow up. I think the only thing that actually got him back here was the fact that he's running out of medications. He was supposed to have been here a month and a half ago to follow up on his sed rate, etc., for his polymyalgia. In the meantime he's continued his prednisone, which he thought was 10 mg daily, but is actually 5 mg daily. He's only been taking one a day, which is fine with me as that is what I suggested his last visit here anyway. He did try to go off of it and felt as though his knees were more painful, though he didn't have the more generalized severe aches and pains that he had when he first developed the polymyalgia and presented here. We will continue the 5 mg of prednisone for now and check sed rate and CRP and consider whether we can go to every other day. He is reluctant to break his pills in half for some reason. We could also just change to 2.5 mg tablets. He has had some recent problems with cold type symptoms and wants a cough medicine refilled that he had refilled approximately two years ago and has used very sparingly since that time with coughs associated with colds. He has Tussionex, and I told him he needs to be careful about how much he uses, but we will refill it for now. He brings the old bottle, which is two years old, so I don't think he's abusing it in any way. Additionally he wants a refill on his Cialis. His other medications remain the same. Will check him back in six weeks' time with hopefully further reduction in his prednisone, etc.  At that point he should be fasting for lipids and CMP as well. He denies other new issues today. Patient Active Problem List  
 Diagnosis Date Noted  PMR (polymyalgia rheumatica) ([facility]) 10/04/2017 Priority: 1 - One  
 CAD, multiple vessel 10/03/2017 Priority: 1 - One  
 Hypertension 10/03/2017 Priority: 1 - One  Dyslipidemia 09/18/2017 Priority: 1 - One  
 COPD (chronic obstructive pulmonary disease) ([facility] 10/03/2017 Priority: 2 - Two  
 IGT (impaired glucose tolerance) 01/18/2018 Priority: 3 - Three  Tobacco abuse disorder 10/03/2017 Priority: 3 - Three  Allergic rhinitis 03/18/2018 Priority: 4 - Four  History of CVA (cerebrovascular accident) 10/03/2017 Priority: 4 - Four  ED (erectile dysfunction) 10/03/2017 Priority: 4 - Four  Fever blister 10/03/2017 Priority: 4 - Four  History of kidney stones 10/03/2017 Priority: 4 - Four  Vitamin D deficiency 10/03/2017 Priority: 5 - Five  PE (physical exam), annual 10/03/2017 Past Surgical History:  
Procedure Laterality Date  HX CORONARY STENT PLACEMENT    
 HX KNEE ARTHROSCOPY Bilateral   
  
Allergies Allergen Reactions  Pcn [Penicillins] Unknown (comments) Family History Problem Relation Age of Onset  Heart Disease Mother  Stroke Mother  Heart Disease Father  High Cholesterol Father  High Cholesterol Sister  High Cholesterol Brother Social History Social History  Marital status: UNKNOWN Spouse name: N/A  
 Number of children: N/A  
 Years of education: N/A Occupational History  Not on file. Social History Main Topics  Smoking status: Current Every Day Smoker Packs/day: 0.50  Smokeless tobacco: Never Used  Alcohol use 8.4 oz/week 14 Standard drinks or equivalent per week  Drug use: Not on file  Sexual activity: Not on file Other Topics Concern  Not on file Social History Narrative Outpatient Prescriptions Marked as Taking for the 10/16/18 encounter (Office Visit) with Aurora Boyd MD  
Medication Sig Dispense Refill  levocetirizine (XYZAL) 5 mg tablet Take 5 mg by mouth daily.  chlorpheniramine-HYDROcodone (TUSSIONEX) 10-8 mg/5 mL suspension Take 5 mL by mouth every twelve (12) hours as needed for Cough. Max Daily Amount: 10 mL. 120 mL 0  
 predniSONE (DELTASONE) 5 mg tablet Take 1 Tab by mouth daily. 60 Tab 0  
 tadalafil (CIALIS) 20 mg tablet Take 1 Tab by mouth as needed. 20 Tab 0  
 metoprolol succinate (TOPROL-XL) 50 mg XL tablet TAKE 1 TABLET BY MOUTH EVERY DAY 90 Tab 2  
 sildenafil citrate (VIAGRA) 100 mg tablet Take 100 mg by mouth as needed.  rosuvastatin (CRESTOR) 40 mg tablet TAKE 1 TABLET BY MOUTH DAILY 90 Tab 2  clopidogrel (PLAVIX) 75 mg tab TAKE 1 TABLET BY MOUTH DAILY 90 Tab 3  
 aspirin delayed-release 81 mg tablet Take 81 mg by mouth daily.  cholecalciferol (VITAMIN D3) 1,000 unit cap Take 1,000 Units by mouth daily. Review of Systems Constitutional:  He denies fever, weight loss, sweats or fatigue. HEENT:  No blurred or double vision, headache or dizziness. No difficulty with swallowing, taste, speech or smell. Respiratory:  No cough, wheezing or shortness of breath. No sputum production. Cardiac:  Denies chest pain, palpitations, unexplained indigestion, syncope, edema, PND or orthopnea. GI:  No changes in bowel movements, no abdominal pain, no bloating, anorexia, nausea, vomiting or heartburn. :  No frequency or dysuria. Denies incontinence. Extremities:  No joint pain, stiffness or swelling. Skin:  No recent rashes or mole changes. Neurological:  No numbness, tingling, burning paresthesias or loss of motor strength. No syncope, dizziness, frequent headaches or memory loss. Objective:  
 
Vitals:  
 10/16/18 1028 BP: 118/78 Pulse: (!) 52 Temp: 97.8 °F (36.6 °C) TempSrc: Oral  
SpO2: 98% Weight: 164 lb 12.8 oz (74.8 kg) Height: 5' 8\" (1.727 m) PainSc:   0 - No pain Body mass index is 25.06 kg/(m^2). Physical Examination: General Appearance:  Well-developed, well-nourished, no acute  distress. HEENT:   
 Ears:  The TMs and ear canals were clear. Eyes:  The pupillary responses were normal.  Extraocular muscle function intact. Lids and conjunctiva not injected. Neck:  Supple without thyromegaly or adenopathy. No JVD noted. Lungs:  Clear to auscultation and percussion. Cardiac:  Regular rate and rhythm without murmur. GI: nontender w/o mass. Normal BS's. Extremities:  No clubbing, cyanosis or edema. Skin:  No rash or unusual mole changes noted. Neurological:  Grossly normal.     
 
 
 
Assessment/Plan:  
Impressions: ICD-10-CM ICD-9-CM 1. Encounter for immunization Z23 V03.89 INFLUENZA VACCINE INACTIVATED (IIV), SUBUNIT, ADJUVANTED, IM  
   PNEUMOCOCCAL CONJ VACCINE 13 VALENT IM 2. Cough R05 786.2 chlorpheniramine-HYDROcodone (TUSSIONEX) 10-8 mg/5 mL suspension 3. PMR (polymyalgia rheumatica) ([facility]) M35.3 725 AMB POC SEDIMENTATION RATE, ERYTHROCYTE; NON AUTO C REACTIVE PROTEIN, QT  
   predniSONE (DELTASONE) 5 mg tablet 4. Erectile dysfunction, unspecified erectile dysfunction type N52.9 607.84 tadalafil (CIALIS) 20 mg tablet Plan: 1. Continue present meds 2. Discussed lifestyle modifications including Na restriction, low carb/fat diet, weight reduction and exercise (at least a walking program). Follow-up Disposition: 
Return in about 6 weeks (around 11/27/2018). Orders Placed This Encounter  Influenza Vaccine Inactivated (IIV) (FLUAD), Subunit, Adjuvanted, IM (82785)  Pneumococcal conjugate 13 valent, IM (PREVNAR-13)  C REACTIVE PROTEIN, QT  
 AMB POC SEDIMENTATION RATE, ERYTHROCYTE; NON AUTO  chlorpheniramine-HYDROcodone (TUSSIONEX) 10-8 mg/5 mL suspension Sig: Take 5 mL by mouth every twelve (12) hours as needed for Cough. Max Daily Amount: 10 mL. Dispense:  120 mL Refill:  0  
 predniSONE (DELTASONE) 5 mg tablet Sig: Take 1 Tab by mouth daily. Dispense:  60 Tab Refill:  0  
 tadalafil (CIALIS) 20 mg tablet Sig: Take 1 Tab by mouth as needed. Dispense:  20 Tab Refill:  0 Kike Sprague MD

## 2018-10-16 NOTE — PROGRESS NOTES
After obtaining consent, and per orders of Dr. Sean Vaughan, injection of fluad given by Cata Goldstein LPN. Patient instructed to remain in clinic for 10 minutes afterwards, and to report any adverse reaction to me immediately. No adverse reactions reported or noted

## 2018-10-17 LAB — CRP SERPL QL: 0.8 MG/L

## 2018-10-26 DIAGNOSIS — M35.3 PMR (POLYMYALGIA RHEUMATICA) (HCC): ICD-10-CM

## 2018-10-26 DIAGNOSIS — Z23 ENCOUNTER FOR IMMUNIZATION: ICD-10-CM

## 2018-10-26 DIAGNOSIS — R05.9 COUGH: ICD-10-CM

## 2018-10-26 DIAGNOSIS — N52.9 ERECTILE DYSFUNCTION, UNSPECIFIED ERECTILE DYSFUNCTION TYPE: ICD-10-CM

## 2018-10-26 RX ORDER — PREDNISONE 5 MG/1
5 TABLET ORAL EVERY OTHER DAY
Qty: 30 TAB | Refills: 0 | Status: CANCELLED | OUTPATIENT
Start: 2018-10-26

## 2018-10-26 NOTE — TELEPHONE ENCOUNTER
RX refill request from the patient/pharmacy. Patient last seen 10/16/2018 with labs, and next appt. scheduled for 11/28/2018. Requested Prescriptions     Pending Prescriptions Disp Refills    predniSONE (DELTASONE) 5 mg tablet 30 Tab 0     Sig: Take 1 Tab by mouth every other day. Last filled  10/16/2018   Patient states that he only had a few tablets left and needed a refill. A user error has taken place: medication ordered in error, not dispensed to this patient.

## 2018-12-06 ENCOUNTER — OFFICE VISIT (OUTPATIENT)
Dept: INTERNAL MEDICINE CLINIC | Age: 63
End: 2018-12-06

## 2018-12-06 VITALS
HEART RATE: 47 BPM | DIASTOLIC BLOOD PRESSURE: 80 MMHG | WEIGHT: 170 LBS | HEIGHT: 68 IN | SYSTOLIC BLOOD PRESSURE: 140 MMHG | OXYGEN SATURATION: 98 % | BODY MASS INDEX: 25.76 KG/M2

## 2018-12-06 DIAGNOSIS — I10 ESSENTIAL HYPERTENSION: ICD-10-CM

## 2018-12-06 DIAGNOSIS — M35.3 PMR (POLYMYALGIA RHEUMATICA) (HCC): Primary | ICD-10-CM

## 2018-12-06 DIAGNOSIS — Z23 ENCOUNTER FOR IMMUNIZATION: ICD-10-CM

## 2018-12-06 DIAGNOSIS — E78.5 DYSLIPIDEMIA: ICD-10-CM

## 2018-12-06 LAB
ALBUMIN SERPL-MCNC: 4 G/DL (ref 3.9–5.4)
ALKALINE PHOS POC: 114 U/L (ref 38–126)
ALT SERPL-CCNC: 30 U/L (ref 9–52)
AST SERPL-CCNC: 19 U/L (ref 14–36)
BUN BLD-MCNC: 19 MG/DL (ref 9–20)
CALCIUM BLD-MCNC: 9.7 MG/DL (ref 8.4–10.2)
CHLORIDE BLD-SCNC: 105 MMOL/L (ref 98–107)
CHOLEST SERPL-MCNC: 135 MG/DL (ref 0–200)
CO2 POC: 29 MMOL/L (ref 22–32)
CREAT BLD-MCNC: 1 MG/DL (ref 0.8–1.5)
EGFR (POC): 78.6
ERYTHROCYTE [SEDIMENTATION RATE] IN BLOOD: 3 MM/HR (ref 0–10)
GLUCOSE POC: 105 MG/DL (ref 75–110)
HDLC SERPL-MCNC: 47 MG/DL (ref 35–130)
LDL CHOLESTEROL POC: 65.4 MG/DL (ref 0–130)
POTASSIUM SERPL-SCNC: 4.8 MMOL/L (ref 3.6–5)
PROT SERPL-MCNC: 6.9 G/DL (ref 6.3–8.2)
SODIUM SERPL-SCNC: 143 MMOL/L (ref 137–145)
TCHOL/HDL RATIO (POC): 2.9 (ref 0–4)
TOTAL BILIRUBIN POC: 0.6 MG/DL (ref 0.2–1.3)
TRIGL SERPL-MCNC: 113 MG/DL (ref 0–200)
VLDLC SERPL CALC-MCNC: 22.6 MG/DL

## 2018-12-06 RX ORDER — PREDNISONE 1 MG/1
1 TABLET ORAL
Qty: 14 TAB | Refills: 0 | Status: SHIPPED | OUTPATIENT
Start: 2018-12-06 | End: 2019-02-20 | Stop reason: SDUPTHER

## 2018-12-06 NOTE — PROGRESS NOTES
Reviewed record in preparation for visit and have obtained necessary documentation. Identified pt with two pt identifiers(name and ). Chief Complaint   Patient presents with    Follow-up        Coordination of Care Questionnaire:  :     1) Have you been to an emergency room, urgent care clinic since your last visit? No     Hospitalized since your last visit? No             2) Have you seen or consulted any other health care providers outside of [address] [place] [address] since your last visit?  No

## 2018-12-06 NOTE — PROGRESS NOTES
Sed rate normal so taper of prednisone should be OK. Blood sugar, liver and kidney function normal.  Lipids excellent. LDL 65  Total cholesterol 135. No change in Rx.

## 2018-12-06 NOTE — PROGRESS NOTES
Subjective:   Chago Dumont is a 70 y.o. male      Chief Complaint   Patient presents with    Follow-up        History of present illness:  Mr. Morgan Bauman returns in follow up. He has had no problems with aches and pains on the prednisone 5 mg every other day. It has been more than a year since we started the prednisone and we are going to try to decrease it to 1 mg a day for two weeks and then stop it and see how he does. He denies new cardiorespiratory, GI or  symptoms. He is due for check of his lipids today. He remains on all his usual medications. He denies symptoms suggestive of angina or congestive heart failure. He has had no  symptoms. He denies nausea, vomiting, diarrhea or other GI symptoms as well. We did discuss that should he have recrudescence of aches and pains he will call us and we will get him back in to recheck his sed rate and C reactive protein and decide about the level of prednisone that he should get at that point. Hopefully that will not be necessary. We will set him up for his comprehensive examination in March. We will give him Shingrix today first dose.         Patient Active Problem List    Diagnosis Date Noted    PMR (polymyalgia rheumatica) ([facility] 10/04/2017     Priority: 1 - One    CAD, multiple vessel 10/03/2017     Priority: 1 - One    Hypertension 10/03/2017     Priority: 1 - One    Dyslipidemia 09/18/2017     Priority: 1 - One    COPD (chronic obstructive pulmonary disease) ([facility] 10/03/2017     Priority: 2 - Two    IGT (impaired glucose tolerance) 01/18/2018     Priority: 3 - Three    Tobacco abuse disorder 10/03/2017     Priority: 3 - Three    Allergic rhinitis 03/18/2018     Priority: 4 - Four    History of CVA (cerebrovascular accident) 10/03/2017     Priority: 4 - Four    ED (erectile dysfunction) 10/03/2017     Priority: 4 - Four    Fever blister 10/03/2017     Priority: 4 - Four    History of kidney stones 10/03/2017     Priority: 4 - Four    Vitamin D deficiency 10/03/2017     Priority: 5 - Five    PE (physical exam), annual 10/03/2017      Past Surgical History:   Procedure Laterality Date    HX CORONARY STENT PLACEMENT      HX KNEE ARTHROSCOPY Bilateral       Allergies   Allergen Reactions    Pcn [Penicillins] Unknown (comments)      Family History   Problem Relation Age of Onset    Heart Disease Mother     Stroke Mother     Heart Disease Father     High Cholesterol Father     High Cholesterol Sister     High Cholesterol Brother       Social History     Socioeconomic History    Marital status: SINGLE     Spouse name: Not on file    Number of children: Not on file    Years of education: Not on file    Highest education level: Not on file   Social Needs    Financial resource strain: Not on file    Food insecurity - worry: Not on file    Food insecurity - inability: Not on file   [employer] needs - medical: Not on file   [employer] needs - non-medical: Not on file   Occupational History    Not on file   Tobacco Use    Smoking status: Current Every Day Smoker     Packs/day: 0.50    Smokeless tobacco: Never Used   Substance and Sexual Activity    Alcohol use: Yes     Alcohol/week: 8.4 oz     Types: 14 Standard drinks or equivalent per week    Drug use: Not on file    Sexual activity: Not on file   Other Topics Concern    Not on file   Social History Narrative    Not on file     Outpatient Medications Marked as Taking for the 12/6/18 encounter (Office Visit) with Eris Manriquez MD   Medication Sig Dispense Refill    predniSONE (DELTASONE) 1 mg tablet Take 1 Tab by mouth daily (with breakfast). 14 Tab 0    levocetirizine (XYZAL) 5 mg tablet Take 5 mg by mouth daily.  tadalafil (CIALIS) 20 mg tablet Take 1 Tab by mouth as needed. 20 Tab 0    metoprolol succinate (TOPROL-XL) 50 mg XL tablet TAKE 1 TABLET BY MOUTH EVERY DAY 90 Tab 2    sildenafil citrate (VIAGRA) 100 mg tablet Take 100 mg by mouth as needed.       rosuvastatin (CRESTOR) 40 mg tablet TAKE 1 TABLET BY MOUTH DAILY 90 Tab 2    clopidogrel (PLAVIX) 75 mg tab TAKE 1 TABLET BY MOUTH DAILY 90 Tab 3    aspirin delayed-release 81 mg tablet Take 81 mg by mouth daily.  cholecalciferol (VITAMIN D3) 1,000 unit cap Take 1,000 Units by mouth daily.  valACYclovir (VALTREX) 1 gram tablet Take 1,000 mg by mouth. Take 2 with onset fever blister then 2 12 hrs later          Review of Systems              Constitutional:  He denies fever, weight loss, sweats or fatigue. HEENT:  No blurred or double vision, headache or dizziness. No difficulty with swallowing, taste, speech or smell. Respiratory:  No cough, wheezing or shortness of breath. No sputum production. Cardiac:  Denies chest pain, palpitations, unexplained indigestion, syncope, edema, PND or orthopnea. GI:  No changes in bowel movements, no abdominal pain, no bloating, anorexia, nausea, vomiting or heartburn. :  No frequency or dysuria. Denies incontinence. Extremities:  No joint pain, stiffness or swelling. Skin:  No recent rashes or mole changes. Neurological:  No numbness, tingling, burning paresthesias or loss of motor strength. No syncope, dizziness, frequent headaches or memory loss. Objective:     Vitals:    12/06/18 0826   BP: 140/80   Pulse: (!) 47   SpO2: 98%   Weight: 170 lb (77.1 kg)   Height: 5' 8\" (1.727 m)   PainSc:   0 - No pain       Body mass index is 25.85 kg/m². Physical Examination:              General Appearance:  Well-developed, well-nourished, no acute  distress. HEENT:     Ears:  The TMs and ear canals were clear. Eyes:  The pupillary responses were normal.  Extraocular muscle function intact. Lids and conjunctiva not injected. Neck:  Supple without thyromegaly or adenopathy. No JVD noted. Lungs:  Clear to auscultation and percussion. Cardiac:  Regular rate and rhythm without murmur. GI: nontender w/o mass. Normal BS's.   Extremities: No clubbing, cyanosis or edema. Skin:  No rash or unusual mole changes noted. Neurological:  Grossly normal.            Assessment/Plan:   Impressions:      ICD-10-CM ICD-9-CM    1. PMR (polymyalgia rheumatica) ([facility]) M35.3 725 SED RATE (ESR)      predniSONE (DELTASONE) 1 mg tablet   2. Dyslipidemia E78.5 272.4 LIPID PANEL      METABOLIC PANEL, COMPREHENSIVE   3. Essential hypertension O35 143.4 METABOLIC PANEL, COMPREHENSIVE   4. Encounter for immunization Z23 V03.89 ZOSTER VACC RECOMBINANT ADJUVANTED        Plan:  1. Continue present meds  2. Discussed lifestyle modifications including Na restriction, low carb/fat diet, weight reduction and exercise (at least a walking program). Follow-up Disposition:  Return in about 4 months (around 4/6/2019) for CPE. Orders Placed This Encounter    Zoster VACC Recominant Adjuvanted (Shingrix)    LIPID PANEL    SED RATE (ESR)    METABOLIC PANEL, COMPREHENSIVE    predniSONE (DELTASONE) 1 mg tablet     Sig: Take 1 Tab by mouth daily (with breakfast).      Dispense:  14 Tab     Refill:  0       Moe Funez MD

## 2018-12-06 NOTE — PROGRESS NOTES
After obtaining consent, and per verbal order from Dr. Kris Ramirez , patient received Shingrix vaccine given by Salbador Munguia LPN in Left Deltoid. Shingrix Vaccine 0.5 mL IM now. Patient was observed for 15 minutes post injection. Patient tolerated injection well with no adverse effects. VIS given.

## 2018-12-07 NOTE — PROGRESS NOTES
Patient informed that Sed rate normal so taper of prednisone should be OK. Blood sugar, liver and kidney function normal.  Lipids excellent. LDL 65  Total cholesterol 135. No change in Rx.

## 2019-01-24 RX ORDER — METOPROLOL SUCCINATE 50 MG/1
TABLET, EXTENDED RELEASE ORAL
Qty: 90 TAB | Refills: 2 | Status: SHIPPED | OUTPATIENT
Start: 2019-01-24 | End: 2019-12-20 | Stop reason: SDUPTHER

## 2019-01-24 RX ORDER — ROSUVASTATIN CALCIUM 40 MG/1
TABLET, COATED ORAL
Qty: 90 TAB | Refills: 2 | Status: SHIPPED | OUTPATIENT
Start: 2019-01-24 | End: 2019-12-20 | Stop reason: SDUPTHER

## 2019-01-24 RX ORDER — CLOPIDOGREL BISULFATE 75 MG/1
TABLET ORAL
Qty: 90 TAB | Refills: 3 | Status: SHIPPED | OUTPATIENT
Start: 2019-01-24 | End: 2019-12-20 | Stop reason: SDUPTHER

## 2019-02-19 ENCOUNTER — TELEPHONE (OUTPATIENT)
Dept: INTERNAL MEDICINE CLINIC | Age: 64
End: 2019-02-19

## 2019-02-20 DIAGNOSIS — M35.3 PMR (POLYMYALGIA RHEUMATICA) (HCC): ICD-10-CM

## 2019-02-20 RX ORDER — PREDNISONE 1 MG/1
1 TABLET ORAL
Qty: 14 TAB | Refills: 0 | Status: SHIPPED | OUTPATIENT
Start: 2019-02-20 | End: 2019-04-19

## 2019-02-20 NOTE — TELEPHONE ENCOUNTER
PCP: Axel Hernandez MD    Last appt: 12/6/2018  Future Appointments   Date Time Provider [place]   3/5/2019  8:30 AM Axel Hernandez MD [address]   4/3/2019  8:00 AM LAB ONLY [facility] SCHED   4/10/2019  9:00 AM Mary Simmons MD [facility] SCHED       Requested Prescriptions     Pending Prescriptions Disp Refills    predniSONE (DELTASONE) 1 mg tablet 14 Tab 0     Sig: Take 1 Tab by mouth daily (with breakfast). Prior labs and Blood pressures:  BP Readings from Last 3 Encounters:   12/06/18 140/80   10/16/18 118/78   06/13/18 116/72     Lab Results   Component Value Date/Time    Sodium 140 10/31/2013 11:18 PM    Potassium 4.0 10/31/2013 11:18 PM    Chloride 104 10/31/2013 11:18 PM    CO2 25 10/31/2013 11:18 PM    Anion gap 11 10/31/2013 11:18 PM    Glucose 115 (H) 10/31/2013 11:18 PM    BUN 12 10/31/2013 11:18 PM    Creatinine 0.75 10/31/2013 11:18 PM    BUN/Creatinine ratio 16 10/31/2013 11:18 PM    GFR est AA >60 10/31/2013 11:18 PM    GFR est non-AA >60 10/31/2013 11:18 PM    Calcium 9.0 10/31/2013 11:18 PM     Lab Results   Component Value Date/Time    Hemoglobin A1c (POC) 6.0 (A) 03/14/2018 08:09 AM     Lab Results   Component Value Date/Time    Cholesterol (POC) 135.0 12/06/2018 08:58 AM    HDL Cholesterol (POC) 47.0 12/06/2018 08:58 AM    LDL Cholesterol (POC) 65.4 12/06/2018 08:58 AM    Triglycerides (POC) 113.0 12/06/2018 08:58 AM     No results found for: Sean Cole, XQVID3, XQVID, VD3RIA    No results found for: TSH, TSH2, TSH3, TSHP, TSHEXT  Dr John Mercado informed that Patient called stating that he was having aches and pains and requesting a refill on the 1 mg prednisone tablets. Patient was informed that per verbal order that Dr John Comp will refill for only 2 weeks and patient will need to follow up. Appointment scheduled for 3/5/2019 at 8:30am and patient was in agreement with this.

## 2019-03-05 ENCOUNTER — OFFICE VISIT (OUTPATIENT)
Dept: INTERNAL MEDICINE CLINIC | Age: 64
End: 2019-03-05

## 2019-03-05 VITALS
TEMPERATURE: 97.5 F | HEART RATE: 53 BPM | OXYGEN SATURATION: 98 % | BODY MASS INDEX: 26.43 KG/M2 | HEIGHT: 68 IN | DIASTOLIC BLOOD PRESSURE: 80 MMHG | WEIGHT: 174.4 LBS | SYSTOLIC BLOOD PRESSURE: 132 MMHG

## 2019-03-05 DIAGNOSIS — M35.3 PMR (POLYMYALGIA RHEUMATICA) (HCC): Primary | ICD-10-CM

## 2019-03-05 DIAGNOSIS — I10 ESSENTIAL HYPERTENSION: ICD-10-CM

## 2019-03-05 LAB
ANION GAP SERPL CALC-SCNC: 9 MMOL/L
BUN SERPL-MCNC: 19 MG/DL (ref 9–20)
CALCIUM SERPL-MCNC: 10.2 MG/DL (ref 8.4–10.2)
CHLORIDE SERPL-SCNC: 105 MMOL/L (ref 98–107)
CO2 SERPL-SCNC: 28 MMOL/L (ref 22–32)
CREAT SERPL-MCNC: 1 MG/DL (ref 0.8–1.5)
GLUCOSE SERPL-MCNC: 104 MG/DL (ref 75–110)
POTASSIUM SERPL-SCNC: 5 MMOL/L (ref 3.6–5)
SED RATE (ESR): 4 MM/HR (ref 0–10)
SODIUM SERPL-SCNC: 142 MMOL/L (ref 137–145)

## 2019-03-05 NOTE — PROGRESS NOTES
Chief Complaint   Patient presents with    Generalized Body Aches       Depression Risk Factor Screening:     3 most recent PHQ Screens 3/5/2019   Little interest or pleasure in doing things Not at all   Feeling down, depressed, irritable, or hopeless Not at all   Total Score PHQ 2 0       Functional Ability and Level of Safety:     Activities of Daily Living  ADL Assessment 2018   Feeding yourself No Help Needed   Getting from bed to chair No Help Needed   Getting dressed No Help Needed   Bathing or showering No Help Needed   Walk across the room (includes cane/walker) No Help Needed   Using the telphone No Help Needed   Taking your medications No Help Needed   Preparing meals No Help Needed   Managing money (expenses/bills) No Help Needed   Moderately strenuous housework (laundry) No Help Needed   Shopping for personal items (toiletries/medicines) No Help Needed   Shopping for groceries No Help Needed   Driving No Help Needed   Climbing a flight of stairs No Help Needed   Getting to places beyond walking distances No Help Needed       Fall Risk  Fall Risk Assessment, last 12 mths 2018   Able to walk? Yes   Fall in past 12 months? No       Abuse Screen  Abuse Screening Questionnaire 10/4/2017   Do you ever feel afraid of your partner? N   Are you in a relationship with someone who physically or mentally threatens you? N   Is it safe for you to go home? Y     Reviewed record in preparation for visit and have obtained necessary documentation. Identified pt with two pt identifiers(name and ).     Chief Complaint   Patient presents with    Generalized Body Aches      Wt Readings from Last 3 Encounters:   19 174 lb 6.4 oz (79.1 kg)   18 170 lb (77.1 kg)   10/16/18 164 lb 12.8 oz (74.8 kg)     Temp Readings from Last 3 Encounters:   19 97.5 °F (36.4 °C) (Oral)   10/16/18 97.8 °F (36.6 °C) (Oral)   18 97.6 °F (36.4 °C) (Oral)     BP Readings from Last 3 Encounters:   19 144/80   12/06/18 140/80   10/16/18 118/78     Pulse Readings from Last 3 Encounters:   03/05/19 (!) 53   12/06/18 (!) 47   10/16/18 (!) 52       Coordination of Care Questionnaire:  :     1) Have you been to an emergency room, urgent care clinic since your last visit? No     Hospitalized since your last visit? No               2) Have you seen or consulted any other health care providers outside of [address] [place] [address] since your last visit?  Yes Dr Tino Martini            Patient Care Team   Patient Care Team:  Dwane Ganser, MD as PCP - General (Internal Medicine)

## 2019-03-05 NOTE — PROGRESS NOTES
Subjective:   Stefan Ramon is a 70 y.o. male      Chief Complaint   Patient presents with    Generalized Body Aches        History of present illness:  Mr. Tenzin Cristobal returns in follow up. After he tapered off prednisone completely he noted some knee and thigh pain and went back on prednisone 1 mg daily with mostly resolution of these symptoms. That raises the question as to whether his PMR is fully in remission or whether he is going to need a longer term treatment with low dose prednisone. Will check a C-reactive protein and sed rate today to help us decide. He only has a couple more 1 mg prednisone and will stop those when he completes them and see whether he has recurrence of symptoms. We will then have to decide about recheck of blood work and treatment at that point. He denies new cardiorespiratory, GI or  symptoms. He has had problems with his vision and is going to be scheduled for cataract surgery. We will complete the forms that he brings with him today when we know when the surgery is going to be. He will let us know about that. He denies other new issues today. He is due his comprehensive examination in the next 2-3 months' time and we will try to schedule that today.         Patient Active Problem List    Diagnosis Date Noted    PMR (polymyalgia rheumatica) ([email or web address].) 10/04/2017     Priority: 1 - One    CAD, multiple vessel 10/03/2017     Priority: 1 - One    Hypertension 10/03/2017     Priority: 1 - One    Dyslipidemia 09/18/2017     Priority: 1 - One    COPD (chronic obstructive pulmonary disease) ([email or web address] 75.) 10/03/2017     Priority: 2 - Two    IGT (impaired glucose tolerance) 01/18/2018     Priority: 3 - Three    Tobacco abuse disorder 10/03/2017     Priority: 3 - Three    Allergic rhinitis 03/18/2018     Priority: 4 - Four    History of CVA (cerebrovascular accident) 10/03/2017     Priority: 4 - Four    ED (erectile dysfunction) 10/03/2017     Priority: 4 - Four    Fever blister 10/03/2017 Priority: 4 - Four    History of kidney stones 10/03/2017     Priority: 4 - Four    Vitamin D deficiency 10/03/2017     Priority: 5 - Five    PE (physical exam), annual 10/03/2017      Past Surgical History:   Procedure Laterality Date    HX CORONARY STENT PLACEMENT      HX KNEE ARTHROSCOPY Bilateral       Allergies   Allergen Reactions    Pcn [Penicillins] Unknown (comments)      Family History   Problem Relation Age of Onset    Heart Disease Mother     Stroke Mother     Heart Disease Father     High Cholesterol Father     High Cholesterol Sister     High Cholesterol Brother       Social History     Socioeconomic History    Marital status: SINGLE     Spouse name: Not on file    Number of children: Not on file    Years of education: Not on file    Highest education level: Not on file   Social Needs    Financial resource strain: Not on file    Food insecurity - worry: Not on file    Food insecurity - inability: Not on file   [employer] needs - medical: Not on file   [employer] needs - non-medical: Not on file   Occupational History    Not on file   Tobacco Use    Smoking status: Current Every Day Smoker     Packs/day: 0.50    Smokeless tobacco: Never Used   Substance and Sexual Activity    Alcohol use: Yes     Alcohol/week: 8.4 oz     Types: 14 Standard drinks or equivalent per week    Drug use: Not on file    Sexual activity: Not on file   Other Topics Concern    Not on file   Social History Narrative    Not on file     Outpatient Medications Marked as Taking for the 3/5/19 encounter (Office Visit) with Dirk Cain MD   Medication Sig Dispense Refill    predniSONE (DELTASONE) 1 mg tablet Take 1 Tab by mouth daily (with breakfast).  14 Tab 0    rosuvastatin (CRESTOR) 40 mg tablet TAKE 1 TABLET BY MOUTH DAILY 90 Tab 2    clopidogrel (PLAVIX) 75 mg tab TAKE 1 TABLET BY MOUTH DAILY 90 Tab 3    metoprolol succinate (TOPROL-XL) 50 mg XL tablet TAKE 1 TABLET BY MOUTH EVERY DAY 90 Tab 2    levocetirizine (XYZAL) 5 mg tablet Take 5 mg by mouth daily.  chlorpheniramine-HYDROcodone (TUSSIONEX) 10-8 mg/5 mL suspension Take 5 mL by mouth every twelve (12) hours as needed for Cough. Max Daily Amount: 10 mL. 120 mL 0    tadalafil (CIALIS) 20 mg tablet Take 1 Tab by mouth as needed. 20 Tab 0    sildenafil citrate (VIAGRA) 100 mg tablet Take 100 mg by mouth as needed.  aspirin delayed-release 81 mg tablet Take 81 mg by mouth daily.  cholecalciferol (VITAMIN D3) 1,000 unit cap Take 1,000 Units by mouth daily.  valACYclovir (VALTREX) 1 gram tablet Take 1,000 mg by mouth. Take 2 with onset fever blister then 2 12 hrs later          Review of Systems              Constitutional:  He denies fever, weight loss, sweats or fatigue. HEENT:  No blurred or double vision, headache or dizziness. No difficulty with swallowing, taste, speech or smell. Respiratory:  No cough, wheezing or shortness of breath. No sputum production. Cardiac:  Denies chest pain, palpitations, unexplained indigestion, syncope, edema, PND or orthopnea. GI:  No changes in bowel movements, no abdominal pain, no bloating, anorexia, nausea, vomiting or heartburn. :  No frequency or dysuria. Denies incontinence. Extremities:  No joint pain, stiffness or swelling. Skin:  No recent rashes or mole changes. Neurological:  No numbness, tingling, burning paresthesias or loss of motor strength. No syncope, dizziness, frequent headaches or memory loss. Objective:     Vitals:    03/05/19 0834 03/05/19 0920   BP: 144/80 132/80   Pulse: (!) 53    Temp: 97.5 °F (36.4 °C)    TempSrc: Oral    SpO2: 98%    Weight: 174 lb 6.4 oz (79.1 kg)    Height: 5' 8\" (1.727 m)    PainSc:   5    PainLoc: Knee        Body mass index is 26.52 kg/m². Physical Examination:              General Appearance:  Well-developed, well-nourished, no acute  distress.              HEENT:     Ears:  The TMs and ear canals were clear.   Eyes:  The pupillary responses were normal.  Extraocular muscle function intact. Lids and conjunctiva not injected. Neck:  Supple without thyromegaly or adenopathy. No JVD noted. Lungs:  Clear to auscultation and percussion. Cardiac:  Regular rate and rhythm without murmur. GI: nontender w/o mass. Normal BS's. Extremities:  No clubbing, cyanosis or edema. Skin:  No rash or unusual mole changes noted. Neurological:  Grossly normal.            Assessment/Plan:   Impressions:      ICD-10-CM ICD-9-CM    1. PMR (polymyalgia rheumatica) ([facility]) M35.3 725 SED RATE (ESR)      METABOLIC PANEL, BASIC      C REACTIVE PROTEIN, QT   2. Essential hypertension R22 754.5 METABOLIC PANEL, BASIC        Plan:  1. Continue present meds  2. Discussed lifestyle modifications including Na restriction, low carb/fat diet, weight reduction and exercise (at least a walking program). Follow-up Disposition:  Return in about 3 months (around 6/5/2019) for CPE.       Orders Placed This Encounter    SED RATE (ESR) ([facility])    METABOLIC PANEL, BASIC ([facility])    C REACTIVE PROTEIN, QT       Josselyn Garcia MD

## 2019-03-06 LAB — CRP SERPL-MCNC: 1.5 MG/L (ref 0–4.9)

## 2019-03-06 NOTE — PROGRESS NOTES
Sed rate and CRP both normal so polymyalgia should be in remission.   Finish prednisone and let us know how he does

## 2019-03-08 NOTE — PROGRESS NOTES
PHI verified. Patient informed that Dr. Samuel Mahoney has reviewed lab results and stated Sed rate and CRP both normal so polymyalgia should be in remission. Finish prednisone and let us know how he does. Patient states he stopped taking the Prednisone and only had 4 tablets left. He also states he is feeling better, but will let us know if anything changes.

## 2019-03-22 ENCOUNTER — TELEPHONE (OUTPATIENT)
Dept: INTERNAL MEDICINE CLINIC | Age: 64
End: 2019-03-22

## 2019-03-26 NOTE — TELEPHONE ENCOUNTER
Patient called and informed that per verbal order from Dr Clive Juan,  before Cataract surgery hold Plavix x 5 days, and continue Asprin.

## 2019-04-19 ENCOUNTER — HOSPITAL ENCOUNTER (OUTPATIENT)
Dept: GENERAL RADIOLOGY | Age: 64
Discharge: HOME | End: 2019-04-19
Attending: INTERNAL MEDICINE
Payer: COMMERCIAL

## 2019-04-19 DIAGNOSIS — R05.9 COUGH: ICD-10-CM

## 2019-04-19 PROCEDURE — 71046 X-RAY EXAM CHEST 2 VIEWS: CPT

## 2020-01-03 ENCOUNTER — HOSPITAL ENCOUNTER (OUTPATIENT)
Dept: GENERAL RADIOLOGY | Age: 65
Discharge: HOME | End: 2020-01-03
Attending: INTERNAL MEDICINE
Payer: COMMERCIAL

## 2020-01-03 DIAGNOSIS — R05.9 COUGH: ICD-10-CM

## 2020-01-03 PROCEDURE — 71046 X-RAY EXAM CHEST 2 VIEWS: CPT

## 2022-01-20 PROBLEM — I70.0 AORTIC CALCIFICATION (HCC): Status: ACTIVE | Noted: 2021-09-10

## 2022-03-18 PROBLEM — I70.0 AORTIC CALCIFICATION (HCC): Status: ACTIVE | Noted: 2021-09-10

## 2022-03-18 PROBLEM — N52.9 ED (ERECTILE DYSFUNCTION): Status: ACTIVE | Noted: 2017-10-03

## 2022-03-18 PROBLEM — M35.3 PMR (POLYMYALGIA RHEUMATICA) (HCC): Status: ACTIVE | Noted: 2017-10-04

## 2022-03-18 PROBLEM — J30.9 ALLERGIC RHINITIS: Status: ACTIVE | Noted: 2018-03-18

## 2022-03-18 PROBLEM — Z00.00 PE (PHYSICAL EXAM), ANNUAL: Status: ACTIVE | Noted: 2017-10-03

## 2022-03-19 PROBLEM — B00.1 FEVER BLISTER: Status: ACTIVE | Noted: 2017-10-03

## 2022-03-19 PROBLEM — I10 HYPERTENSION: Status: ACTIVE | Noted: 2017-10-03

## 2022-03-19 PROBLEM — E78.5 DYSLIPIDEMIA: Status: ACTIVE | Noted: 2017-09-18

## 2022-03-19 PROBLEM — Z72.0 TOBACCO ABUSE DISORDER: Status: ACTIVE | Noted: 2017-10-03

## 2022-03-19 PROBLEM — Z86.73 HISTORY OF CVA (CEREBROVASCULAR ACCIDENT): Status: ACTIVE | Noted: 2017-10-03

## 2022-03-19 PROBLEM — I25.10 CAD, MULTIPLE VESSEL: Status: ACTIVE | Noted: 2017-10-03

## 2022-03-19 PROBLEM — R73.02 IGT (IMPAIRED GLUCOSE TOLERANCE): Status: ACTIVE | Noted: 2018-01-18

## 2022-03-19 PROBLEM — E55.9 VITAMIN D DEFICIENCY: Status: ACTIVE | Noted: 2017-10-03

## 2022-03-19 PROBLEM — Z87.442 HISTORY OF KIDNEY STONES: Status: ACTIVE | Noted: 2017-10-03

## 2022-03-20 PROBLEM — J44.9 COPD (CHRONIC OBSTRUCTIVE PULMONARY DISEASE) (HCC): Status: ACTIVE | Noted: 2017-10-03

## 2022-04-29 ENCOUNTER — HOSPITAL ENCOUNTER (OUTPATIENT)
Dept: ULTRASOUND IMAGING | Age: 67
Discharge: HOME | End: 2022-04-29
Attending: INTERNAL MEDICINE
Payer: COMMERCIAL

## 2022-04-29 DIAGNOSIS — R60.0 BILATERAL LEG EDEMA: ICD-10-CM

## 2022-04-29 DIAGNOSIS — Z79.899 ENCOUNTER FOR LONG-TERM (CURRENT) USE OF OTHER MEDICATIONS: ICD-10-CM

## 2022-04-29 PROCEDURE — 93970 EXTREMITY STUDY: CPT

## 2023-09-12 ENCOUNTER — HOSPITAL ENCOUNTER (OUTPATIENT)
Age: 68
Discharge: HOME | End: 2023-09-15
Payer: MEDICARE

## 2023-09-12 DIAGNOSIS — I10 ESSENTIAL HYPERTENSION: ICD-10-CM

## 2023-09-12 DIAGNOSIS — Z79.899 ENCOUNTER FOR LONG-TERM (CURRENT) USE OF MEDICATIONS: ICD-10-CM

## 2023-09-12 DIAGNOSIS — R41.0 ACUTE CONFUSION: ICD-10-CM

## 2023-09-12 PROCEDURE — 70553 MRI BRAIN STEM W/O & W/DYE: CPT

## 2023-09-12 PROCEDURE — 6360000004 HC RX CONTRAST MEDICATION: Performed by: RADIOLOGY

## 2023-09-12 PROCEDURE — A9579 GAD-BASE MR CONTRAST NOS,1ML: HCPCS | Performed by: RADIOLOGY

## 2023-09-12 RX ADMIN — GADOTERIDOL 15 ML: 279.3 INJECTION, SOLUTION INTRAVENOUS at 15:50

## 2023-10-06 ENCOUNTER — HOSPITAL ENCOUNTER (OUTPATIENT)
Age: 68
End: 2023-10-06
Payer: MEDICARE

## 2023-10-06 DIAGNOSIS — R05.9 COUGH, UNSPECIFIED TYPE: ICD-10-CM

## 2023-10-06 PROCEDURE — 71046 X-RAY EXAM CHEST 2 VIEWS: CPT

## 2023-10-13 ENCOUNTER — HOSPITAL ENCOUNTER (INPATIENT)
Facility: HOSPITAL | Age: 68
LOS: 4 days | Discharge: HOME HEALTH | DRG: 084 | End: 2023-10-17
Attending: EMERGENCY MEDICINE | Admitting: FAMILY MEDICINE
Payer: MEDICARE

## 2023-10-13 ENCOUNTER — HOSPITAL ENCOUNTER (EMERGENCY)
Facility: HOSPITAL | Age: 68
Discharge: HOME | DRG: 084 | End: 2023-10-16
Payer: MEDICARE

## 2023-10-13 ENCOUNTER — APPOINTMENT (OUTPATIENT)
Facility: HOSPITAL | Age: 68
DRG: 084 | End: 2023-10-13
Payer: MEDICARE

## 2023-10-13 DIAGNOSIS — S09.90XA INJURY OF HEAD, INITIAL ENCOUNTER: ICD-10-CM
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
## 2023-10-13 DIAGNOSIS — I62.00 SUBDURAL HEMORRHAGE (HCC): ICD-10-CM

## 2023-10-13 DIAGNOSIS — I60.9 SUBARACHNOID HEMORRHAGE (HCC): ICD-10-CM

## 2023-10-13 LAB
ALBUMIN SERPL-MCNC: 3.6 G/DL (ref 3.5–5)
ALBUMIN/GLOB SERPL: 1 (ref 1.1–2.2)
ALP SERPL-CCNC: 110 U/L (ref 45–117)
ALT SERPL-CCNC: 21 U/L (ref 12–78)
ANION GAP SERPL CALC-SCNC: 7 MMOL/L (ref 5–15)
AST SERPL-CCNC: 16 U/L (ref 15–37)
BASOPHILS # BLD: 0.1 K/UL (ref 0–0.1)
BASOPHILS NFR BLD: 1 % (ref 0–1)
BILIRUB SERPL-MCNC: 0.5 MG/DL (ref 0.2–1)
BUN SERPL-MCNC: 21 MG/DL (ref 6–20)
BUN/CREAT SERPL: 17 (ref 12–20)
CALCIUM SERPL-MCNC: 9.4 MG/DL (ref 8.5–10.1)
CHLORIDE SERPL-SCNC: 103 MMOL/L (ref 97–108)
CO2 SERPL-SCNC: 23 MMOL/L (ref 21–32)
COMMENT:: NORMAL
CREAT SERPL-MCNC: 1.25 MG/DL (ref 0.7–1.3)
DIFFERENTIAL METHOD BLD: NORMAL
EKG ATRIAL RATE: 91 BPM
EKG DIAGNOSIS: NORMAL
EKG P AXIS: 70 DEGREES
EKG P-R INTERVAL: 168 MS
EKG Q-T INTERVAL: 334 MS
EKG QRS DURATION: 70 MS
EKG QTC CALCULATION (BAZETT): 410 MS
EKG R AXIS: 54 DEGREES
EKG T AXIS: 33 DEGREES
EKG VENTRICULAR RATE: 91 BPM
EOSINOPHIL # BLD: 0.3 K/UL (ref 0–0.4)
EOSINOPHIL NFR BLD: 3 % (ref 0–7)
ERYTHROCYTE [DISTWIDTH] IN BLOOD BY AUTOMATED COUNT: 13.2 % (ref 11.5–14.5)
GLOBULIN SER CALC-MCNC: 3.6 G/DL (ref 2–4)
GLUCOSE SERPL-MCNC: 178 MG/DL (ref 65–100)
HCT VFR BLD AUTO: 45.9 % (ref 36.6–50.3)
HGB BLD-MCNC: 15.8 G/DL (ref 12.1–17)
IMM GRANULOCYTES # BLD AUTO: 0 K/UL (ref 0–0.04)
IMM GRANULOCYTES NFR BLD AUTO: 0 % (ref 0–0.5)
LYMPHOCYTES # BLD: 3.1 K/UL (ref 0.8–3.5)
LYMPHOCYTES NFR BLD: 31 % (ref 12–49)
MCH RBC QN AUTO: 30.6 PG (ref 26–34)
MCHC RBC AUTO-ENTMCNC: 34.4 G/DL (ref 30–36.5)
MCV RBC AUTO: 89 FL (ref 80–99)
MONOCYTES # BLD: 0.6 K/UL (ref 0–1)
MONOCYTES NFR BLD: 6 % (ref 5–13)
NEUTS SEG # BLD: 5.9 K/UL (ref 1.8–8)
NEUTS SEG NFR BLD: 59 % (ref 32–75)
NRBC # BLD: 0 K/UL (ref 0–0.01)
NRBC BLD-RTO: 0 PER 100 WBC
PLATELET # BLD AUTO: 168 K/UL (ref 150–400)
PMV BLD AUTO: 10.7 FL (ref 8.9–12.9)
POTASSIUM SERPL-SCNC: 3.6 MMOL/L (ref 3.5–5.1)
PROT SERPL-MCNC: 7.2 G/DL (ref 6.4–8.2)
RBC # BLD AUTO: 5.16 M/UL (ref 4.1–5.7)
SODIUM SERPL-SCNC: 133 MMOL/L (ref 136–145)
SPECIMEN HOLD: NORMAL
TROPONIN I SERPL HS-MCNC: 12 NG/L (ref 0–76)
WBC # BLD AUTO: 10 K/UL (ref 4.1–11.1)

## 2023-10-13 PROCEDURE — 93010 ELECTROCARDIOGRAM REPORT: CPT | Performed by: INTERNAL MEDICINE

## 2023-10-13 PROCEDURE — 99285 EMERGENCY DEPT VISIT HI MDM: CPT

## 2023-10-13 PROCEDURE — 70450 CT HEAD/BRAIN W/O DYE: CPT

## 2023-10-13 PROCEDURE — 80053 COMPREHEN METABOLIC PANEL: CPT

## 2023-10-13 PROCEDURE — 2060000000 HC ICU INTERMEDIATE R&B

## 2023-10-13 PROCEDURE — 6370000000 HC RX 637 (ALT 250 FOR IP): Performed by: EMERGENCY MEDICINE

## 2023-10-13 PROCEDURE — 2580000003 HC RX 258: Performed by: FAMILY MEDICINE

## 2023-10-13 PROCEDURE — 36415 COLL VENOUS BLD VENIPUNCTURE: CPT

## 2023-10-13 PROCEDURE — 70486 CT MAXILLOFACIAL W/O DYE: CPT

## 2023-10-13 PROCEDURE — 71045 X-RAY EXAM CHEST 1 VIEW: CPT

## 2023-10-13 PROCEDURE — 85025 COMPLETE CBC W/AUTO DIFF WBC: CPT

## 2023-10-13 PROCEDURE — 93005 ELECTROCARDIOGRAM TRACING: CPT

## 2023-10-13 PROCEDURE — 2500000003 HC RX 250 WO HCPCS: Performed by: EMERGENCY MEDICINE

## 2023-10-13 PROCEDURE — 84484 ASSAY OF TROPONIN QUANT: CPT

## 2023-10-13 PROCEDURE — 72125 CT NECK SPINE W/O DYE: CPT

## 2023-10-13 RX ORDER — SODIUM CHLORIDE 9 MG/ML
INJECTION, SOLUTION INTRAVENOUS PRN
Status: DISCONTINUED | OUTPATIENT
Start: 2023-10-13 | End: 2023-10-17 | Stop reason: HOSPADM

## 2023-10-13 RX ORDER — SODIUM CHLORIDE 0.9 % (FLUSH) 0.9 %
5-40 SYRINGE (ML) INJECTION PRN
Status: DISCONTINUED | OUTPATIENT
Start: 2023-10-13 | End: 2023-10-17 | Stop reason: HOSPADM

## 2023-10-13 RX ORDER — ACETAMINOPHEN 325 MG/1
650 TABLET ORAL EVERY 4 HOURS PRN
Status: DISCONTINUED | OUTPATIENT
Start: 2023-10-13 | End: 2023-10-17 | Stop reason: HOSPADM

## 2023-10-13 RX ORDER — ONDANSETRON 2 MG/ML
4 INJECTION INTRAMUSCULAR; INTRAVENOUS EVERY 6 HOURS PRN
Status: DISCONTINUED | OUTPATIENT
Start: 2023-10-13 | End: 2023-10-17 | Stop reason: HOSPADM

## 2023-10-13 RX ORDER — ONDANSETRON 4 MG/1
4 TABLET, ORALLY DISINTEGRATING ORAL EVERY 8 HOURS PRN
Status: DISCONTINUED | OUTPATIENT
Start: 2023-10-13 | End: 2023-10-17 | Stop reason: HOSPADM

## 2023-10-13 RX ORDER — LIDOCAINE HYDROCHLORIDE AND EPINEPHRINE 10; 10 MG/ML; UG/ML
20 INJECTION, SOLUTION INFILTRATION; PERINEURAL
Status: COMPLETED | OUTPATIENT
Start: 2023-10-13 | End: 2023-10-13

## 2023-10-13 RX ORDER — SODIUM CHLORIDE 0.9 % (FLUSH) 0.9 %
5-40 SYRINGE (ML) INJECTION EVERY 12 HOURS SCHEDULED
Status: DISCONTINUED | OUTPATIENT
Start: 2023-10-13 | End: 2023-10-17 | Stop reason: HOSPADM

## 2023-10-13 RX ORDER — LABETALOL HYDROCHLORIDE 5 MG/ML
10 INJECTION, SOLUTION INTRAVENOUS EVERY 6 HOURS PRN
Status: DISCONTINUED | OUTPATIENT
Start: 2023-10-13 | End: 2023-10-17 | Stop reason: HOSPADM

## 2023-10-13 RX ADMIN — Medication 3 ML: at 19:09

## 2023-10-13 RX ADMIN — Medication 10 ML: at 22:25

## 2023-10-13 RX ADMIN — LIDOCAINE HYDROCHLORIDE,EPINEPHRINE BITARTRATE 20 ML: 10; .01 INJECTION, SOLUTION INFILTRATION; PERINEURAL at 19:08

## 2023-10-13 ASSESSMENT — ENCOUNTER SYMPTOMS
VOMITING: 0
ABDOMINAL PAIN: 0
SHORTNESS OF BREATH: 0
BLURRED VISION: 0
DIARRHEA: 0
BACK PAIN: 0
DIFFICULTY BREATHING: 0
RHINORRHEA: 0
COUGH: 0
SORE THROAT: 0
COLOR CHANGE: 0
EYE PAIN: 0
NAUSEA: 0

## 2023-10-13 ASSESSMENT — PAIN - FUNCTIONAL ASSESSMENT: PAIN_FUNCTIONAL_ASSESSMENT: 0-10

## 2023-10-13 ASSESSMENT — PAIN DESCRIPTION - ORIENTATION: ORIENTATION: ANTERIOR

## 2023-10-13 ASSESSMENT — PAIN DESCRIPTION - DESCRIPTORS: DESCRIPTORS: ACHING

## 2023-10-13 ASSESSMENT — PAIN DESCRIPTION - LOCATION
LOCATION: FACE
LOCATION: HEAD
LOCATION: HEAD

## 2023-10-13 ASSESSMENT — PAIN SCALES - GENERAL
PAINLEVEL_OUTOF10: 5

## 2023-10-13 NOTE — ED NOTES
Pt moved from hallway bed to room 10. Assumed care of this patient. Pt alert and oriented, speaking in full clear sentences. Blood noted to mouth and lip. Bruising under bilateral eyes. Pt placed on cardiac monitor with continuous pulse ox and bp cuff. Call bell within reach. No voiced needs at this time.       Halima Youngblood RN  10/13/23 2290

## 2023-10-13 NOTE — ED TRIAGE NOTES
Pt arrives ambulatory to triage w/ complaint of syncope/ fall/ Facial lac. Pt fell w/ LOC today w/ bruising to nose, mouth/ and chin. Pt knocked out a tooth with bleeding to lip. Bleeding well controlled. Pt is on plavix d/t stroke hx.

## 2023-10-14 ENCOUNTER — APPOINTMENT (OUTPATIENT)
Facility: HOSPITAL | Age: 68
DRG: 084 | End: 2023-10-14
Payer: MEDICARE

## 2023-10-14 LAB
ANION GAP SERPL CALC-SCNC: 6 MMOL/L (ref 5–15)
BUN SERPL-MCNC: 21 MG/DL (ref 6–20)
BUN/CREAT SERPL: 19 (ref 12–20)
CALCIUM SERPL-MCNC: 9.3 MG/DL (ref 8.5–10.1)
CHLORIDE SERPL-SCNC: 104 MMOL/L (ref 97–108)
CHOLEST SERPL-MCNC: 126 MG/DL
CO2 SERPL-SCNC: 25 MMOL/L (ref 21–32)
CREAT SERPL-MCNC: 1.1 MG/DL (ref 0.7–1.3)
ERYTHROCYTE [DISTWIDTH] IN BLOOD BY AUTOMATED COUNT: 13.2 % (ref 11.5–14.5)
EST. AVERAGE GLUCOSE BLD GHB EST-MCNC: 120 MG/DL
GLUCOSE BLD STRIP.AUTO-MCNC: 113 MG/DL (ref 65–117)
GLUCOSE BLD STRIP.AUTO-MCNC: 115 MG/DL (ref 65–117)
GLUCOSE SERPL-MCNC: 97 MG/DL (ref 65–100)
HBA1C MFR BLD: 5.8 % (ref 4–5.6)
HCT VFR BLD AUTO: 44.1 % (ref 36.6–50.3)
HDLC SERPL-MCNC: 37 MG/DL
HDLC SERPL: 3.4 (ref 0–5)
HGB BLD-MCNC: 14.9 G/DL (ref 12.1–17)
LDLC SERPL CALC-MCNC: 63.2 MG/DL (ref 0–100)
MCH RBC QN AUTO: 30.8 PG (ref 26–34)
MCHC RBC AUTO-ENTMCNC: 33.8 G/DL (ref 30–36.5)
MCV RBC AUTO: 91.3 FL (ref 80–99)
NRBC # BLD: 0 K/UL (ref 0–0.01)
NRBC BLD-RTO: 0 PER 100 WBC
PLATELET # BLD AUTO: 155 K/UL (ref 150–400)
PMV BLD AUTO: 10.5 FL (ref 8.9–12.9)
POTASSIUM SERPL-SCNC: 3.8 MMOL/L (ref 3.5–5.1)
RBC # BLD AUTO: 4.83 M/UL (ref 4.1–5.7)
SERVICE CMNT-IMP: NORMAL
SERVICE CMNT-IMP: NORMAL
SODIUM SERPL-SCNC: 135 MMOL/L (ref 136–145)
TRIGL SERPL-MCNC: 129 MG/DL
TROPONIN I SERPL HS-MCNC: 21 NG/L (ref 0–76)
VLDLC SERPL CALC-MCNC: 25.8 MG/DL
WBC # BLD AUTO: 10.5 K/UL (ref 4.1–11.1)

## 2023-10-14 PROCEDURE — 94760 N-INVAS EAR/PLS OXIMETRY 1: CPT

## 2023-10-14 PROCEDURE — 6370000000 HC RX 637 (ALT 250 FOR IP): Performed by: FAMILY MEDICINE

## 2023-10-14 PROCEDURE — 97161 PT EVAL LOW COMPLEX 20 MIN: CPT

## 2023-10-14 PROCEDURE — 70450 CT HEAD/BRAIN W/O DYE: CPT

## 2023-10-14 PROCEDURE — 80048 BASIC METABOLIC PNL TOTAL CA: CPT

## 2023-10-14 PROCEDURE — 97535 SELF CARE MNGMENT TRAINING: CPT

## 2023-10-14 PROCEDURE — 97165 OT EVAL LOW COMPLEX 30 MIN: CPT

## 2023-10-14 PROCEDURE — 97116 GAIT TRAINING THERAPY: CPT

## 2023-10-14 PROCEDURE — 6370000000 HC RX 637 (ALT 250 FOR IP): Performed by: HOSPITALIST

## 2023-10-14 PROCEDURE — 80061 LIPID PANEL: CPT

## 2023-10-14 PROCEDURE — 84484 ASSAY OF TROPONIN QUANT: CPT

## 2023-10-14 PROCEDURE — 36415 COLL VENOUS BLD VENIPUNCTURE: CPT

## 2023-10-14 PROCEDURE — 83036 HEMOGLOBIN GLYCOSYLATED A1C: CPT

## 2023-10-14 PROCEDURE — 2580000003 HC RX 258: Performed by: FAMILY MEDICINE

## 2023-10-14 PROCEDURE — 82962 GLUCOSE BLOOD TEST: CPT

## 2023-10-14 PROCEDURE — 85027 COMPLETE CBC AUTOMATED: CPT

## 2023-10-14 PROCEDURE — 2060000000 HC ICU INTERMEDIATE R&B

## 2023-10-14 RX ORDER — VITAMIN B COMPLEX
1000 TABLET ORAL DAILY
Status: DISCONTINUED | OUTPATIENT
Start: 2023-10-14 | End: 2023-10-17 | Stop reason: HOSPADM

## 2023-10-14 RX ORDER — ROSUVASTATIN CALCIUM 40 MG/1
40 TABLET, COATED ORAL NIGHTLY
Status: DISCONTINUED | OUTPATIENT
Start: 2023-10-14 | End: 2023-10-17 | Stop reason: HOSPADM

## 2023-10-14 RX ORDER — METOPROLOL SUCCINATE 50 MG/1
50 TABLET, EXTENDED RELEASE ORAL DAILY
Status: DISCONTINUED | OUTPATIENT
Start: 2023-10-14 | End: 2023-10-16

## 2023-10-14 RX ORDER — ESCITALOPRAM OXALATE 10 MG/1
10 TABLET ORAL DAILY
Status: DISCONTINUED | OUTPATIENT
Start: 2023-10-14 | End: 2023-10-17 | Stop reason: HOSPADM

## 2023-10-14 RX ORDER — NICOTINE 21 MG/24HR
1 PATCH, TRANSDERMAL 24 HOURS TRANSDERMAL DAILY
Status: DISCONTINUED | OUTPATIENT
Start: 2023-10-14 | End: 2023-10-17 | Stop reason: HOSPADM

## 2023-10-14 RX ADMIN — ESCITALOPRAM OXALATE 10 MG: 10 TABLET ORAL at 09:03

## 2023-10-14 RX ADMIN — Medication 6 ML: at 09:03

## 2023-10-14 RX ADMIN — ROSUVASTATIN 40 MG: 40 TABLET, FILM COATED ORAL at 22:45

## 2023-10-14 RX ADMIN — ACETAMINOPHEN 650 MG: 325 TABLET ORAL at 07:01

## 2023-10-14 RX ADMIN — ACETAMINOPHEN 650 MG: 325 TABLET ORAL at 00:31

## 2023-10-14 RX ADMIN — CHOLECALCIFEROL TAB 25 MCG (1000 UNIT) 1000 UNITS: 25 TAB at 09:03

## 2023-10-14 ASSESSMENT — PAIN SCALES - GENERAL
PAINLEVEL_OUTOF10: 8
PAINLEVEL_OUTOF10: 0

## 2023-10-14 NOTE — ED NOTES
TRANSFER - OUT REPORT:    Verbal report given to [employer], RN on Florecita Pride  being transferred to [address] [place] [address] for routine progression of patient care       Report consisted of patient's Situation, Background, Assessment and   Recommendations(SBAR). Information from the following report(s) Nurse Handoff Report, Index, ED SBAR, Adult Overview, Intake/Output, MAR, Recent Results, Quality Measures, and Neuro Assessment was reviewed with the receiving nurse. [place] Fall Assessment:    Presents to emergency department  because of falls (Syncope, seizure, or loss of consciousness): Yes  Age > 79: No  Altered Mental Status, Intoxication with alcohol or substance confusion (Disorientation, impaired judgment, poor safety awaremess, or inability to follow instructions): No  Impaired Mobility: Ambulates or transfers with assistive devices or assistance; Unable to ambulate or transer.: No  Nursing Judgement: Yes          Lines:   Peripheral IV 10/13/23 Left Antecubital (Active)   Site Assessment Clean, dry & intact 10/13/23 1711   Line Status Blood return noted; Flushed;Normal saline locked;Specimen collected 10/13/23 1711   Phlebitis Assessment No symptoms 10/13/23 1711   Infiltration Assessment 0 10/13/23 1711   Dressing Status Clean, dry & intact 10/13/23 1711   Dressing Type Transparent 10/13/23 1711   Dressing Intervention New 10/13/23 1711        Opportunity for questions and clarification was provided.       Patient transported with:  Monitor and Registered Nurse           Alma Capone  10/13/23 6266

## 2023-10-14 NOTE — PROGRESS NOTES
OCCUPATIONAL THERAPY EVALUATION/DISCHARGE    Patient: Doe Tee (70 y.o. male)  Date: 10/14/2023  Primary Diagnosis: Subarachnoid hemorrhage (HCC) [I60.9]  Subdural hemorrhage ([address]) [I62.00]  Fall from bed, initial encounter [W06. XXXA]  Injury of head, initial encounter [S09.90XA]         Precautions: Fall    ASSESSMENT :  The patient is limited by decreased independence in ADLs, high-level IADLs, endurance, safety awareness, cognition, increased pain levels. AAOx3, disoriented to date. The patient is overall set/up to supervision assistance. He has significant facial bruises and trauma, his daughter assisted with UB dressing and LB dressing today. Patient was educated on importance of fall prevention and safety with ADLs. Per daughter the patient has been having a cognitive declining in the last months needing help with most IADLs to include medication management and poor short term memory. She has been staying with him. Recommend HHOT to address lingering deficits in self-care with diagnosis of SAH/SDH as well as home safety evaluation. No acute care OT needs, recommend supervision for OOB activity by family or staff. If the patient is planning to DC home alone without his daughter he may benefit from re-consult in OT. Functional Outcome Measure: The patient scored 22/24 on the AM PAC outcome measure which is indicative of minimal assistance for self-care. PLAN :  Recommendations and Planned Interventions:   DC      Recommendation for discharge: (in order for the patient to meet his/her long term goals):  Therapy 2 days/week in the home and also see \"other factors to consider\" below for additional discharge concerns/needs    Other factors to consider for discharge: impaired cognition, high risk for falls, and concern for safely navigating or managing the home environment    IF patient discharges home will need the following DME: patient owns DME required for discharge       SUBJECTIVE:   Patient Demonstration;Verbal  Barriers to Learning: Cognition  Education Outcome: Verbalized understanding;Demonstrated understanding;Continued education needed    Thank you for this referral.  Doug Hager OT  Minutes: 31    Occupational Therapy Evaluation Charge Determination   History Examination Decision-Making   MEDIUM Complexity : Expanded review of history including physical, cognitive and psychocial history  LOW Complexity: 1-3 Performance deficits relating to physical, cognitive, or psychosocial skills that result in activity limitations and/or participation restrictions LOW Complexity: No comorbidities that affect functional and  no verbal  or physical assist needed to complete eval tasks   Based on the above components, the patient evaluation is determined to be of the following complexity level: Low

## 2023-10-14 NOTE — PROGRESS NOTES
Orders received, chart reviewed and patient evaluated by physical therapy. Pending progression with skilled acute physical therapy, recommend:  Therapy 2 days/week in the home. Increased family support during transition home. Recommend with nursing OOB to chair 3x/day and walking daily with SBA assist. Thank you for completing as able in order to maintain patient strength, endurance and independence. Full evaluation to follow.       Keri Villalba PT, DPT  Geriatric Clinical Specialist
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
## 2023-10-14 NOTE — PROGRESS NOTES
Hospitalist Progress Note  Pierre Casanova MD  Answering service: [phone]        Date of Service:  10/14/2023  NAME:  Collin Burns  :  1955  MRN:  [id number]      Admission Summary:     Patient presented with syncope and fall with intracranial hemorrhage. Interval history / Subjective:     Patient denies any headache or dizziness. Assessment & Plan:     Syncope and collapse  -Cardiac enzymes unremarkable, monitor on telemetry, check 2D echo  -Cardiology evaluation, may consider Holter monitor  -CT head finding as below    Intracranial hemorrhage  -CT head shows extra-axial more than intra-axial acute hemorrhage at the midline between anterior bilateral frontal lobes approximately 7 mm in volume, no significant mass effect  -Neurosurgery consult, repeat head CT pending    Ground-level fall due to syncope  -Multiple facial contusion with lip laceration, also with 1 broken tooth and multiple loose teeth  -CT head finding as above, CT C-spine negative for acute pathology, CT maxillofacial negative for fracture  -Fall precautions, wound care    Hypertension  -Continue Toprol  -Goals systolic blood pressure less than 140    Dyslipidemia  -Continue statin    History of CVA  -Holding antiplatelets due to intracranial hemorrhage    Anxiety/depression  -Continue SSRI     Code status: Full  Prophylaxis: SCDs  Care Plan discussed with: Patient and patient's brother present at bedside. Anticipated Disposition: 24 to 48 hours             Review of Systems:   Pertinent items are noted in HPI. Vital Signs:    Last 24hrs VS reviewed since prior progress note.  Most recent are:  Vitals:    10/14/23 0600   BP:    Pulse: 59   Resp:    Temp:    SpO2:        No intake or output data in the 24 hours ending 10/14/23 0914     Physical Examination:     I had a face to face encounter with this patient and independently examined

## 2023-10-15 PROCEDURE — 6370000000 HC RX 637 (ALT 250 FOR IP): Performed by: HOSPITALIST

## 2023-10-15 PROCEDURE — 2060000000 HC ICU INTERMEDIATE R&B

## 2023-10-15 PROCEDURE — 6370000000 HC RX 637 (ALT 250 FOR IP): Performed by: FAMILY MEDICINE

## 2023-10-15 PROCEDURE — 92610 EVALUATE SWALLOWING FUNCTION: CPT

## 2023-10-15 PROCEDURE — 2580000003 HC RX 258: Performed by: FAMILY MEDICINE

## 2023-10-15 RX ADMIN — Medication 10 ML: at 21:14

## 2023-10-15 RX ADMIN — Medication 5 ML: at 10:02

## 2023-10-15 RX ADMIN — ROSUVASTATIN 40 MG: 40 TABLET, FILM COATED ORAL at 21:14

## 2023-10-15 RX ADMIN — ESCITALOPRAM OXALATE 10 MG: 10 TABLET ORAL at 10:00

## 2023-10-15 RX ADMIN — CHOLECALCIFEROL TAB 25 MCG (1000 UNIT) 1000 UNITS: 25 TAB at 10:02

## 2023-10-15 RX ADMIN — Medication 5 ML: at 21:15

## 2023-10-15 NOTE — CARE COORDINATION
Care Management Initial Assessment       RUR:  9%  Readmission? No  1st IM letter given? Yes   10/15/223  1st  letter given: No    Admission  Subarachnoid hemorrhage--fall from bed  hx of CVA    Plan  Home with home health-- At [address] [place] [address] [phone]    CM met with patient and his brother, Noe Harman, in patient's room. Patient was alert and oriented. He confirmed PCP, insurance and demographics. He has no hx of HH or IPR/SNF    Lives in two level home alone with ability to remain on first floor . He was active and independent prior to admission without the use of DME. He had cva about a month ago and has not been driving. Has good support from family   Daughter is a traveling nurse but stays with patient when she is home. .Brother lives close by. He does have  who comes to clean the house. He said the family is investigating hiring someone to come to assist patient with meal preparation  and bathing. .  CM offered a list of agencies but brother said they did not need a list at this time. Home health order received and Cm talked with patient and brother-- both in agreement and At [address] [place] [address] chosen-- Referral sent via [address] Waiting for response    1st Medicare letter signed 10/15/23    CM will follow for transition of care needs    Contact  Brothjaime Garcia [facility]  [phone]    UPDATE  1 pm  Patient accepted by At [facility] James will notify agency when patient is being discharged  Name and number on AVS     10/15/23 3777   Service Assessment   Patient Orientation Alert and Oriented   Cognition Alert   History Provided By Patient; Child/Family  (brother, Noe Harman)   Support Systems Children;Family Members;Friends/Neighbors   PCP Verified by CM Yes   Last Visit to PCP Within last 3 months   Prior Functional Level Housework; Shopping;Assistance with the following:   Current Functional Level Housework;Cooking; Bathing; Shopping;Assistance with the following:   Can patient return to prior living

## 2023-10-15 NOTE — PLAN OF CARE
Problem: Discharge Planning  Goal: Discharge to home or other facility with appropriate resources  Outcome: Progressing  Flowsheets (Taken 10/14/2023 2000)  Discharge to home or other facility with appropriate resources:   Identify barriers to discharge with patient and caregiver   Arrange for needed discharge resources and transportation as appropriate   Identify discharge learning needs (meds, wound care, etc)   Refer to discharge planning if patient needs post-hospital services based on physician order or complex needs related to functional status, cognitive ability or social support system     Problem: Pain  Goal: Verbalizes/displays adequate comfort level or baseline comfort level  Outcome: Progressing  Flowsheets (Taken 10/14/2023 2000)  Verbalizes/displays adequate comfort level or baseline comfort level: Assess pain using appropriate pain scale     Problem: Safety - Adult  Goal: Free from fall injury  Outcome: Progressing  Flowsheets (Taken 10/14/2023 2000)  Free From Fall Injury:   Instruct family/caregiver on patient safety   Based on caregiver fall risk screen, instruct family/caregiver to ask for assistance with transferring infant if caregiver noted to have fall risk factors     Problem: Physical Therapy - Adult  Goal: By Discharge: Performs mobility at highest level of function for planned discharge setting. See evaluation for individualized goals. 10/14/2023 1348 by Anisha Sagastume PT  Outcome: Progressing  Note: FUNCTIONAL STATUS PRIOR TO ADMISSION: Patient was independent and active without use of DME. Has been declining since TBI in 2019. Patient had a stroke approx one month ago, and had been evading home health services    HOME SUPPORT PRIOR TO ADMISSION: The patient lives alone, however since the stroke he daughter has been staying with to care for him as needed. He has a brother that lives near by as well     Physical Therapy Goals  Initiated 10/14/2023  1.   Patient will move from supine to

## 2023-10-15 NOTE — PROGRESS NOTES
Hospitalist Progress Note  Noé Mariano MD  Answering service:         Date of Service:  10/15/2023  NAME:  Nae Rooney  :  1955  MRN:  [id number]      Admission Summary:     Patient presented with syncope and fall with intracranial hemorrhage. Interval history / Subjective:     Patient denies any headache or dizziness. Assessment & Plan:     Syncope and collapse  -Cardiac enzymes unremarkable, echo with normal EF, no valvular abnormalities  -CT head finding as below  -Patient also with generalized weakness and some gait trouble with risk of fall  -Unclear mechanical fall versus syncope per history  -Continue cardiac monitoring    Intracranial hemorrhage  -CT head shows extra-axial more than intra-axial acute hemorrhage at the midline between anterior bilateral frontal lobes approximately 7 mm in volume, no significant mass effect  -Neurosurgery following, repeat head CT stable    Facial contusion/tooth fracture  -Multiple facial contusion with lip laceration, also with 1 broken tooth and multiple loose teeth  -CT head finding as above, CT C-spine negative for acute pathology, CT maxillofacial negative for fracture  -Fall precautions, wound care, meticulous oral hygiene, oral maxillofacial consult  -Speech therapy following, easy to chew diet    Epistaxis  -Likely from facial trauma  -Nasal saline as needed    Hypertension  -Continue Toprol  -Goals systolic blood pressure less than 140    Dyslipidemia  -Continue statin    History of CVA  -Holding antiplatelets due to intracranial hemorrhage    Anxiety/depression  -Continue SSRI     Code status: Full  Prophylaxis: SCDs  Care Plan discussed with: Patient and patient's brother present at bedside. Anticipated Disposition: 24 to 48 hours             Review of Systems:   Pertinent items are noted in HPI.          Vital Signs:    Last 24hrs VS reviewed

## 2023-10-16 LAB
ANION GAP SERPL CALC-SCNC: 3 MMOL/L (ref 5–15)
BUN SERPL-MCNC: 22 MG/DL (ref 6–20)
BUN/CREAT SERPL: 21 (ref 12–20)
CALCIUM SERPL-MCNC: 9.7 MG/DL (ref 8.5–10.1)
CHLORIDE SERPL-SCNC: 106 MMOL/L (ref 97–108)
CO2 SERPL-SCNC: 27 MMOL/L (ref 21–32)
CREAT SERPL-MCNC: 1.04 MG/DL (ref 0.7–1.3)
GLUCOSE SERPL-MCNC: 101 MG/DL (ref 65–100)
POTASSIUM SERPL-SCNC: 4.2 MMOL/L (ref 3.5–5.1)
SODIUM SERPL-SCNC: 136 MMOL/L (ref 136–145)

## 2023-10-16 PROCEDURE — 6370000000 HC RX 637 (ALT 250 FOR IP): Performed by: FAMILY MEDICINE

## 2023-10-16 PROCEDURE — 6370000000 HC RX 637 (ALT 250 FOR IP): Performed by: HOSPITALIST

## 2023-10-16 PROCEDURE — 97116 GAIT TRAINING THERAPY: CPT

## 2023-10-16 PROCEDURE — 2060000000 HC ICU INTERMEDIATE R&B

## 2023-10-16 PROCEDURE — 92526 ORAL FUNCTION THERAPY: CPT

## 2023-10-16 PROCEDURE — 80048 BASIC METABOLIC PNL TOTAL CA: CPT

## 2023-10-16 PROCEDURE — 97535 SELF CARE MNGMENT TRAINING: CPT

## 2023-10-16 PROCEDURE — 97168 OT RE-EVAL EST PLAN CARE: CPT

## 2023-10-16 PROCEDURE — 36415 COLL VENOUS BLD VENIPUNCTURE: CPT

## 2023-10-16 PROCEDURE — 97112 NEUROMUSCULAR REEDUCATION: CPT

## 2023-10-16 PROCEDURE — 2580000003 HC RX 258: Performed by: FAMILY MEDICINE

## 2023-10-16 RX ADMIN — Medication 5 ML: at 10:09

## 2023-10-16 RX ADMIN — ACETAMINOPHEN 650 MG: 325 TABLET ORAL at 16:06

## 2023-10-16 RX ADMIN — Medication 10 ML: at 23:31

## 2023-10-16 RX ADMIN — Medication 5 ML: at 06:57

## 2023-10-16 RX ADMIN — ACETAMINOPHEN 650 MG: 325 TABLET ORAL at 06:56

## 2023-10-16 RX ADMIN — ROSUVASTATIN 40 MG: 40 TABLET, FILM COATED ORAL at 23:31

## 2023-10-16 RX ADMIN — ESCITALOPRAM OXALATE 10 MG: 10 TABLET ORAL at 10:07

## 2023-10-16 RX ADMIN — CHOLECALCIFEROL TAB 25 MCG (1000 UNIT) 1000 UNITS: 25 TAB at 10:07

## 2023-10-16 NOTE — ED NOTES
Lac Repair    Date/Time: 10/16/2023 12:45 AM    Performed by: Paulina Banda PA-C  Authorized by: Jason Buitrago MD    Consent:     Consent obtained:  Verbal    Consent given by:  Patient    Risks discussed:  Infection, need for additional repair and pain    Alternatives discussed:  No treatment  Universal protocol:     Patient identity confirmed:  Verbally with patient  Anesthesia:     Anesthesia method:  Topical application and local infiltration  Laceration details:     Location:  Lip    Lip location:  Lower lip, full thickness (upper lip)    Vermilion border involved: yes      Height of lip laceration:  Vermilion only    Length (cm):  1.5    Laceration depth: through and through. Pre-procedure details:     Preparation:  Patient was prepped and draped in usual sterile fashion  Exploration:     Hemostasis achieved with:  Direct pressure    Imaging outcome: foreign body not noted    Treatment:     Area cleansed with:  Shur-Clens    Amount of cleaning:  Standard    Visualized foreign bodies/material removed: no    Skin repair:     Repair method:  Sutures    Suture size: Upper lip and outer lower lip: 5-0 non absorbable; Inner Lip: 5-0 fast gut.     Suture material:  Fast-absorbing gut and nylon    Suture technique:  Simple interrupted  Approximation:     Approximation:  Close    Vermilion border well-aligned: yes    Repair type:     Repair type:  Complex  Post-procedure details:     Dressing:  Open (no dressing)    Procedure completion:  Tolerated well, no immediate complications         Paulina Banda PA-C  10/16/23 0048

## 2023-10-16 NOTE — CARE COORDINATION
Transition of Care Plan:    RUR: 8%  Prior Level of Functioning: Independent  Disposition: SNF rehab-If patient returns home, he is open to [facility] for [facility] [facility] PT, OT, and SN. If SNF or IPR: Date FOC offered: 10/16  Date FOC received: 10/16  Accepting facility:   Date authorization started with reference number: N/A  Date authorization received and expires: Follow up appointments: PCP/Specialist  DME needed: None  Transportation at discharge: Family  IM/IMM Medicare/ letter given: 10/15/2023  Is patient a [place] and connected with VA? No   If yes, was [employer] transfer form completed and VA notified? Caregiver Contact: Demi Pacheco(Brother) [phone]  Discharge Caregiver contacted prior to discharge? Yes  Care Conference needed?  No  Barriers to discharge:  Oral surgeon consult    Arianna Pereira RN/CRM  [phone] · Doing well on home O2 3 L NC on exam   · Continue prednisone taper until patient is at 10 mg daily   · Continue Atrovent/Xopenex q i d  nebulizer   · Continue Perforomist and Pulmicort nebulizer  · Continue Roflumilast 250 mcg daily x 4 weeks followed by 500 mcg daily   · OP f/u with Pulm as scheduled

## 2023-10-16 NOTE — CARE COORDINATION
10/16/23 1444   Condition of Participation: Discharge Planning   The Plan for Transition of Care is related to the following treatment goals: SNF rehab   The Patient and/or Patient Representative was provided with a Choice of Provider? Patient   The Patient and/Or Patient Representative agree with the Discharge Plan? Yes   Freedom of Choice list was provided with basic dialogue that supports the patient's individualized plan of care/goals, treatment preferences, and shares the quality data associated with the providers?   Yes

## 2023-10-16 NOTE — PROGRESS NOTES
Physician Progress Note      Thaddeus Carrasquillo  CSN #:                  [id number]  :                       1955  ADMIT DATE:       10/13/2023 5:39 PM  [facility] DATE:  RESPONDING  PROVIDER #:        Edis Sauceda MD          QUERY TEXT:    Pt admitted with traumatic SAH. If possible, please document in progress   notes and discharge summary the length of loss of consciousness, if any:    The medical record reflects the following:  Risk Factors: recent CVA with residual balance issues    Clinical Indicators:  97.8, 98, 16, 168/93, 96 on r/a    10/13 Head CT- Extra-axial more than intra-axial acute hemorrhage at the   midline between the anterior bilateral frontal lobes measures up to   approximately 7 mL in volume. No significant mass effect. No hydrocephalus. CT Maxillofacial- No fracture., No soft tissue contusion. , Midline frontal   lobe intracranial hemorrhage. 10/14 Head CT- Stable interhemispheric extra-axial and left frontal lobe   subarachnoid hemorrhage. ER- face forward fall w/ LOC. Injury to face with bleeding around nose/ mouth. Brief LOC. hx previous CVA with resid unsteadiness. assess: Subarachnoid   hemorrhage, Subdural hemorrhage    H&P- SAH, cortical contusion in left and right, GLF, syncope/ collapse, HTN,   HLD, facial contusion/ lip lac    10/13 NS- small falcine SDH after fall. 10/15 syncope, gen weakness/ gait trouble, unclear fall vs syncope per hx. ICH- no signif mass effect.  facial contusion/ tooth fx, multiple loose teeth,   epistaxis from facial trauma, hld    Treatment: Holding antiplatelets due to intracranial hemorrhage, neuro checks,   elevation of HOB, NS consult, PT    Thank you,  Lilo Santos RN  Clinical Documentation  [phone], or via [employer]  Options provided:  -- Traumatic SAH without LOC  -- Traumatic SAH with LOC 30 minutes or less  -- Traumatic SAH with LOC > 30 minutes but < 1 hour  -- Traumatic SAH with LOC 1 hour to < 6

## 2023-10-16 NOTE — PLAN OF CARE
Problem: Discharge Planning  Goal: Discharge to home or other facility with appropriate resources  10/16/2023 1128 by Ed Gonzalez RN  Outcome: Progressing  Flowsheets (Taken 10/16/2023 0800)  Discharge to home or other facility with appropriate resources: Identify barriers to discharge with patient and caregiver  10/15/2023 2223 by Parul Aly RN  Outcome: Progressing  Flowsheets (Taken 10/15/2023 2000)  Discharge to home or other facility with appropriate resources: Identify barriers to discharge with patient and caregiver     Problem: Pain  Goal: Verbalizes/displays adequate comfort level or baseline comfort level  10/16/2023 1128 by Ed Gonzalez RN  Outcome: Progressing  10/15/2023 2223 by Parul Aly RN  Outcome: Progressing     Problem: Safety - Adult  Goal: Free from fall injury  10/16/2023 1128 by Ed Gonzalez RN  Outcome: Progressing  10/15/2023 2223 by Parul Aly RN  Outcome: Progressing     Problem: Chronic Conditions and Co-morbidities  Goal: Patient's chronic conditions and co-morbidity symptoms are monitored and maintained or improved  10/16/2023 1128 by Ed Gonzalez RN  Outcome: Progressing  Flowsheets (Taken 10/16/2023 0800)  Care Plan - Patient's Chronic Conditions and Co-Morbidity Symptoms are Monitored and Maintained or Improved: Monitor and assess patient's chronic conditions and comorbid symptoms for stability, deterioration, or improvement  10/15/2023 2223 by Parul Aly RN  Outcome: Progressing  Flowsheets (Taken 10/15/2023 2000)  Care Plan - Patient's Chronic Conditions and Co-Morbidity Symptoms are Monitored and Maintained or Improved: Monitor and assess patient's chronic conditions and comorbid symptoms for stability, deterioration, or improvement     Problem: SLP Adult - Impaired Swallowing  Goal: By Discharge: Advance to least restrictive diet without signs or symptoms of aspiration for planned discharge setting.   See evaluation for individualized

## 2023-10-16 NOTE — PLAN OF CARE
Problem: Discharge Planning  Goal: Discharge to home or other facility with appropriate resources  Outcome: Progressing  Flowsheets (Taken 10/15/2023 2000)  Discharge to home or other facility with appropriate resources: Identify barriers to discharge with patient and caregiver     Problem: Pain  Goal: Verbalizes/displays adequate comfort level or baseline comfort level  Outcome: Progressing     Problem: Safety - Adult  Goal: Free from fall injury  Outcome: Progressing     Problem: Chronic Conditions and Co-morbidities  Goal: Patient's chronic conditions and co-morbidity symptoms are monitored and maintained or improved  Outcome: Progressing  Flowsheets (Taken 10/15/2023 2000)  Care Plan - Patient's Chronic Conditions and Co-Morbidity Symptoms are Monitored and Maintained or Improved: Monitor and assess patient's chronic conditions and comorbid symptoms for stability, deterioration, or improvement     Problem: SLP Adult - Impaired Swallowing  Goal: By Discharge: Advance to least restrictive diet without signs or symptoms of aspiration for planned discharge setting. See evaluation for individualized goals. Description: Speech Therapy Goals  Initiated 10/15/23    1. Patient will tolerate least restrictive diet without adverse effects within 7 days.    10/15/2023 1409 by LIZETH Butler  Outcome: Progressing

## 2023-10-16 NOTE — PLAN OF CARE
Problem: Physical Therapy - Adult  Goal: By Discharge: Performs mobility at highest level of function for planned discharge setting. See evaluation for individualized goals. Description: FUNCTIONAL STATUS PRIOR TO ADMISSION: Patient was independent and active without use of DME. Has been declining since TBI in 2019. Patient had a stroke approx one month ago, and had been evading home health services. Smokes. HOME SUPPORT PRIOR TO ADMISSION: The patient lives alone, however since the stroke he daughter has been staying with to care for him as needed. He has a brother that lives near by as well      Physical Therapy Goals  Initiated 10/14/2023  1. Patient will move from supine to sit and sit to supine in bed with modified independence within 7 day(s). 2.  Patient will perform sit to stand with modified independence within 7 day(s). 3.  Patient will transfer from bed to chair and chair to bed with modified independence using the least restrictive device within 7 day(s). 4.  Patient will ambulate with modified independence for 250 feet with the least restrictive device within 7 day(s). 5.  Patient will ascend/descend 12 stairs with 1 handrail(s) with supervision/set-up within 7 day(s). 10/16/2023 1324 by Raúl Malave, PT  Outcome: Progressing  10/16/2023 1323 by Raúl Malave, PT  Outcome: Progressing     PHYSICAL THERAPY TREATMENT    Patient: Janee Monday (28 y.o. male)  Date: 10/16/2023  Diagnosis: Subarachnoid hemorrhage ([address]) [I60.9]  Subdural hemorrhage ([address]) [I62.00]  Fall from bed, initial encounter [W06. XXXA]  Injury of head, initial encounter [S09.90XA] Subarachnoid hemorrhage (HCC)      Precautions:                      ASSESSMENT:  Patient continues to benefit from skilled PT services and is slowly progressing towards goals however remains most limited by impaired cognition (safety, sequencing, insight, recall, impulsivity, etc), impaired balance, impaired gait, and weakness leading to

## 2023-10-17 VITALS
OXYGEN SATURATION: 95 % | DIASTOLIC BLOOD PRESSURE: 74 MMHG | BODY MASS INDEX: 25.51 KG/M2 | TEMPERATURE: 97.7 F | HEART RATE: 73 BPM | RESPIRATION RATE: 19 BRPM | WEIGHT: 165.34 LBS | SYSTOLIC BLOOD PRESSURE: 122 MMHG

## 2023-10-17 PROCEDURE — 2580000003 HC RX 258: Performed by: FAMILY MEDICINE

## 2023-10-17 PROCEDURE — 6370000000 HC RX 637 (ALT 250 FOR IP): Performed by: FAMILY MEDICINE

## 2023-10-17 PROCEDURE — 97116 GAIT TRAINING THERAPY: CPT

## 2023-10-17 PROCEDURE — 97530 THERAPEUTIC ACTIVITIES: CPT

## 2023-10-17 PROCEDURE — 6370000000 HC RX 637 (ALT 250 FOR IP): Performed by: HOSPITALIST

## 2023-10-17 RX ORDER — NICOTINE 21 MG/24HR
1 PATCH, TRANSDERMAL 24 HOURS TRANSDERMAL DAILY
Qty: 30 PATCH | Refills: 0 | Status: SHIPPED | OUTPATIENT
Start: 2023-10-18

## 2023-10-17 RX ORDER — NICOTINE 21 MG/24HR
1 PATCH, TRANSDERMAL 24 HOURS TRANSDERMAL DAILY
Qty: 30 PATCH | Refills: 0 | Status: SHIPPED | OUTPATIENT
Start: 2023-10-18 | End: 2023-10-17 | Stop reason: SDUPTHER

## 2023-10-17 RX ADMIN — Medication 5 ML: at 10:14

## 2023-10-17 RX ADMIN — CHOLECALCIFEROL TAB 25 MCG (1000 UNIT) 1000 UNITS: 25 TAB at 10:13

## 2023-10-17 RX ADMIN — ESCITALOPRAM OXALATE 10 MG: 10 TABLET ORAL at 10:13

## 2023-10-17 RX ADMIN — ACETAMINOPHEN 650 MG: 325 TABLET ORAL at 10:13

## 2023-10-17 RX ADMIN — Medication 10 ML: at 10:14

## 2023-10-17 ASSESSMENT — PAIN SCALES - GENERAL
PAINLEVEL_OUTOF10: 0
PAINLEVEL_OUTOF10: 4
PAINLEVEL_OUTOF10: 3
PAINLEVEL_OUTOF10: 0

## 2023-10-17 ASSESSMENT — PAIN DESCRIPTION - LOCATION: LOCATION: FACE

## 2023-10-17 NOTE — PLAN OF CARE
Problem: Discharge Planning  Goal: Discharge to home or other facility with appropriate resources  10/17/2023 0024 by Florecita Cheung RN  Outcome: Progressing  Flowsheets (Taken 10/16/2023 2009)  Discharge to home or other facility with appropriate resources: Identify barriers to discharge with patient and caregiver  10/16/2023 1128 by Charles Marques RN  Outcome: Progressing  Flowsheets (Taken 10/16/2023 0800)  Discharge to home or other facility with appropriate resources: Identify barriers to discharge with patient and caregiver     Problem: Pain  Goal: Verbalizes/displays adequate comfort level or baseline comfort level  10/17/2023 0024 by Florecita Cheung RN  Outcome: Progressing  10/16/2023 1128 by Charles Marques RN  Outcome: Progressing     Problem: Safety - Adult  Goal: Free from fall injury  10/17/2023 0024 by Florecita Cheung RN  Outcome: Progressing  10/16/2023 1128 by Charles Marques RN  Outcome: Progressing     Problem: Physical Therapy - Adult  Goal: By Discharge: Performs mobility at highest level of function for planned discharge setting. See evaluation for individualized goals. Description: FUNCTIONAL STATUS PRIOR TO ADMISSION: Patient was independent and active without use of DME. Has been declining since TBI in 2019. Patient had a stroke approx one month ago, and had been evading home health services. Smokes. HOME SUPPORT PRIOR TO ADMISSION: The patient lives alone, however since the stroke he daughter has been staying with to care for him as needed. He has a brother that lives near by as well      Physical Therapy Goals  Initiated 10/14/2023  1. Patient will move from supine to sit and sit to supine in bed with modified independence within 7 day(s). 2.  Patient will perform sit to stand with modified independence within 7 day(s). 3.  Patient will transfer from bed to chair and chair to bed with modified independence using the least restrictive device within 7 day(s).   4. Patient will ambulate with modified independence for 250 feet with the least restrictive device within 7 day(s). 5.  Patient will ascend/descend 12 stairs with 1 handrail(s) with supervision/set-up within 7 day(s). 10/16/2023 1324 by Nathalia Lopez, PT  Outcome: Progressing  10/16/2023 1323 by Nathalia Lopez, PT  Outcome: Progressing     Problem: Chronic Conditions and Co-morbidities  Goal: Patient's chronic conditions and co-morbidity symptoms are monitored and maintained or improved  10/17/2023 0024 by Jessy Raymond RN  Outcome: Progressing  Flowsheets (Taken 10/16/2023 2009)  Care Plan - Patient's Chronic Conditions and Co-Morbidity Symptoms are Monitored and Maintained or Improved: Monitor and assess patient's chronic conditions and comorbid symptoms for stability, deterioration, or improvement  10/16/2023 1128 by Ed Gonzalez RN  Outcome: Progressing  Flowsheets (Taken 10/16/2023 0800)  Care Plan - Patient's Chronic Conditions and Co-Morbidity Symptoms are Monitored and Maintained or Improved: Monitor and assess patient's chronic conditions and comorbid symptoms for stability, deterioration, or improvement     Problem: Occupational Therapy - Adult  Goal: By Discharge: Performs self-care activities at highest level of function for planned discharge setting. See evaluation for individualized goals. Description: FUNCTIONAL STATUS PRIOR TO ADMISSION:    Receives Help From: Family (as needed), ADL Assistance: Independent,  ,  ,  ,  ,  , Homemaking Assistance: Needs assistance, Ambulation Assistance: Independent, Transfer Assistance: Independent, Active : No     HOME SUPPORT: Patient lived alone with daughter to provide intermittent assistance. Occupational Therapy Goals:  Initiated 10/16/2023  1. Patient will perform upper body dressing and lower body dressing with Modified [place] within 7 day(s).   2.  Patient will perform bathing and shower transfer with Modified [place] within 7

## 2023-10-17 NOTE — PLAN OF CARE
Problem: Discharge Planning  Goal: Discharge to home or other facility with appropriate resources  Recent Flowsheet Documentation  Taken 10/17/2023 0800 by Mj Sandoval RN  Discharge to home or other facility with appropriate resources: Identify barriers to discharge with patient and caregiver  10/17/2023 0024 by Valley Closs, RN  Outcome: Progressing  Flowsheets (Taken 10/16/2023 2009)  Discharge to home or other facility with appropriate resources: Identify barriers to discharge with patient and caregiver     Problem: Pain  Goal: Verbalizes/displays adequate comfort level or baseline comfort level  Recent Flowsheet Documentation  Taken 10/17/2023 0110 by Valley Closs, RN  Verbalizes/displays adequate comfort level or baseline comfort level: Assess pain using appropriate pain scale  10/17/2023 0024 by Valley Closs, RN  Outcome: Progressing     Problem: Safety - Adult  Goal: Free from fall injury  10/17/2023 0024 by Valley Closs, RN  Outcome: Progressing     Problem: Physical Therapy - Adult  Goal: By Discharge: Performs mobility at highest level of function for planned discharge setting. See evaluation for individualized goals. Description: FUNCTIONAL STATUS PRIOR TO ADMISSION: Patient was independent and active without use of DME. Has been declining since TBI in 2019. Patient had a stroke approx one month ago, and had been evading home health services. Smokes. HOME SUPPORT PRIOR TO ADMISSION: The patient lives alone, however since the stroke he daughter has been staying with to care for him as needed. He has a brother that lives near by as well      Physical Therapy Goals  Initiated 10/14/2023  1. Patient will move from supine to sit and sit to supine in bed with modified independence within 7 day(s). 2.  Patient will perform sit to stand with modified independence within 7 day(s).   3.  Patient will transfer from bed to chair and chair to bed with modified independence using the least

## 2023-10-17 NOTE — PLAN OF CARE
Problem: Discharge Planning  Goal: Discharge to home or other facility with appropriate resources  10/17/2023 1410 by Sanchez Wong RN  Outcome: Adequate for Discharge  Flowsheets (Taken 10/17/2023 0800)  Discharge to home or other facility with appropriate resources: Identify barriers to discharge with patient and caregiver  10/17/2023 0024 by Yvan Khalil RN  Outcome: Progressing  Flowsheets (Taken 10/16/2023 2009)  Discharge to home or other facility with appropriate resources: Identify barriers to discharge with patient and caregiver     Problem: Pain  Goal: Verbalizes/displays adequate comfort level or baseline comfort level  10/17/2023 1410 by Sanchez Wong RN  Outcome: Adequate for Discharge  Flowsheets (Taken 10/17/2023 0110 by Yvan Khalil RN)  Verbalizes/displays adequate comfort level or baseline comfort level: Assess pain using appropriate pain scale  10/17/2023 0024 by Yvan Khalil RN  Outcome: Progressing     Problem: Safety - Adult  Goal: Free from fall injury  10/17/2023 1410 by Sanchez Wong RN  Outcome: Adequate for Discharge  10/17/2023 0024 by Yvan Khalil RN  Outcome: Progressing     Problem: Chronic Conditions and Co-morbidities  Goal: Patient's chronic conditions and co-morbidity symptoms are monitored and maintained or improved  10/17/2023 1410 by Sanchez Wong RN  Outcome: Adequate for Discharge  Flowsheets (Taken 10/17/2023 0800)  Care Plan - Patient's Chronic Conditions and Co-Morbidity Symptoms are Monitored and Maintained or Improved: Monitor and assess patient's chronic conditions and comorbid symptoms for stability, deterioration, or improvement  10/17/2023 0024 by Yvan Khalil RN  Outcome: Progressing  Flowsheets (Taken 10/16/2023 2009)  Care Plan - Patient's Chronic Conditions and Co-Morbidity Symptoms are Monitored and Maintained or Improved: Monitor and assess patient's chronic conditions and comorbid symptoms for stability, deterioration, or improvement

## 2023-10-17 NOTE — PLAN OF CARE
Problem: Physical Therapy - Adult  Goal: By Discharge: Performs mobility at highest level of function for planned discharge setting. See evaluation for individualized goals. Description: FUNCTIONAL STATUS PRIOR TO ADMISSION: Patient was independent and active without use of DME. Has been declining since TBI in 2019. Patient had a stroke approx one month ago, and had been evading home health services. Smokes. HOME SUPPORT PRIOR TO ADMISSION: The patient lives alone, however since the stroke he daughter has been staying with to care for him as needed. He has a brother that lives near by as well      Physical Therapy Goals  Initiated 10/14/2023  1. Patient will move from supine to sit and sit to supine in bed with modified independence within 7 day(s). 2.  Patient will perform sit to stand with modified independence within 7 day(s). 3.  Patient will transfer from bed to chair and chair to bed with modified independence using the least restrictive device within 7 day(s). 4.  Patient will ambulate with modified independence for 250 feet with the least restrictive device within 7 day(s). 5.  Patient will ascend/descend 12 stairs with 1 handrail(s) with supervision/set-up within 7 day(s). Outcome: Progressing    PHYSICAL THERAPY TREATMENT    Patient: Maurice Michel (51 y.o. male)  Date: 10/17/2023  Diagnosis: Subarachnoid hemorrhage (HCC) [I60.9]  Subdural hemorrhage ([address]) [I62.00]  Fall from bed, initial encounter [W06. XXXA]  Injury of head, initial encounter [S09.90XA] Subarachnoid hemorrhage ([address])      Precautions: Fall Risk                    ASSESSMENT:  Patient continues to benefit from skilled PT services and is progressing towards goals. Barriers to indep with functional mobility include impaired cognition (decreased safety awareness/ insight), impaired dynamic standing balance, gait instability. Pt received EOB eating breakfast. Impulsively stood up with request to use restroom.  Required cues

## 2023-10-17 NOTE — CARE COORDINATION
Transition of Care Plan:    Patient was accepted at [facility] and will transport with [facility] at 3:30.  RN call report to [phone]    RUR: 11%  Prior Level of Functioning: Independent  Disposition: Home with [facility]. Patient is open to [facility] for PT, OT, SN. Update: SNF rehab-[facility]. Date authorization started with reference number:   Date authorization received and expires: Follow up appointments: PCP/Specialist  DME needed: None  Transportation at discharge: Family  IM/IMM Medicare/ letter given: 10/17/2023  Is patient a  and connected with [facility]? If yes, was [employer] transfer form completed and VA notified? Caregiver Contact: Maritza Garcia [phone]  Discharge Caregiver contacted prior to discharge? Care Conference needed?  No  Barriers to discharge:  None      Ty Salomon RN/CRM  [phone]

## 2023-11-14 ENCOUNTER — HOSPITAL ENCOUNTER (OUTPATIENT)
Age: 68
Discharge: HOME | End: 2023-11-17
Payer: MEDICARE

## 2023-11-14 DIAGNOSIS — I61.9 BRAIN BLEED (HCC): ICD-10-CM

## 2023-11-14 PROCEDURE — 70450 CT HEAD/BRAIN W/O DYE: CPT

## 2023-12-27 ENCOUNTER — TRANSCRIBE ORDERS (OUTPATIENT)
Age: 68
End: 2023-12-27

## 2023-12-27 NOTE — PROGRESS NOTES
Pc few d ago pt accidentally ingested dish washing soap pod. Poison control advised Pepcid and Prilosec. Dtr says pt doing better.  Call back here prn

## 2024-01-05 ENCOUNTER — TELEPHONE (OUTPATIENT)
Age: 69
End: 2024-01-05

## 2024-01-05 DIAGNOSIS — R26.89 BALANCE PROBLEM: Primary | ICD-10-CM

## 2024-01-05 DIAGNOSIS — Z91.81 HISTORY OF FALL: ICD-10-CM

## 2024-01-05 DIAGNOSIS — R41.89 COGNITIVE IMPAIRMENT: ICD-10-CM

## 2024-05-13 ENCOUNTER — HOSPITAL ENCOUNTER (EMERGENCY)
Facility: HOSPITAL | Age: 69
Discharge: HOME | End: 2024-05-13
Attending: STUDENT IN AN ORGANIZED HEALTH CARE EDUCATION/TRAINING PROGRAM
Payer: MEDICARE

## 2024-05-13 ENCOUNTER — APPOINTMENT (OUTPATIENT)
Facility: HOSPITAL | Age: 69
End: 2024-05-13
Payer: MEDICARE

## 2024-05-13 VITALS
HEART RATE: 71 BPM | OXYGEN SATURATION: 96 % | SYSTOLIC BLOOD PRESSURE: 137 MMHG | HEIGHT: 68 IN | RESPIRATION RATE: 18 BRPM | WEIGHT: 165.34 LBS | DIASTOLIC BLOOD PRESSURE: 70 MMHG | TEMPERATURE: 97.7 F | BODY MASS INDEX: 25.06 KG/M2

## 2024-05-13 DIAGNOSIS — S09.90XA CLOSED HEAD INJURY, INITIAL ENCOUNTER: Primary | ICD-10-CM

## 2024-05-13 PROCEDURE — 70450 CT HEAD/BRAIN W/O DYE: CPT

## 2024-05-13 PROCEDURE — 72125 CT NECK SPINE W/O DYE: CPT

## 2024-05-13 PROCEDURE — 99284 EMERGENCY DEPT VISIT MOD MDM: CPT

## 2024-05-13 ASSESSMENT — ENCOUNTER SYMPTOMS: COLOR CHANGE: 1

## 2024-05-13 ASSESSMENT — PAIN SCALES - GENERAL: PAINLEVEL_OUTOF10: 0

## 2024-05-14 NOTE — DISCHARGE INSTRUCTIONS
Return for new or worsening symptoms.  Ice and compression to the area as tolerated to help with swelling.  Tylenol as needed for pain.

## 2024-05-14 NOTE — ED PROVIDER NOTES
[facility] EMERGENCY DEP  EMERGENCY DEPARTMENT ENCOUNTER      Pt Name: Yakov Pacheco  MRN: [id number]  Birthdate 1955  Date of evaluation: 5/13/2024  Provider: CARLOS Velasquez    CHIEF COMPLAINT       Chief Complaint   Patient presents with    Fall         HISTORY OF PRESENT ILLNESS   (Location/Symptom, Timing/Onset, Context/Setting, Quality, Duration, Modifying Factors, Severity)  Note limiting factors.       68-year-old male presenting to the ED for a fall.  Patient reports that tonight at about 8 PM he was walking to the bathroom when \"there were just too many feet altogether and I tripped.\"  Patient reports he tripped over somebody else's feet and fell, struck his head on the edge of the toilet.  Patient on double antiplatelet therapy.  No LOC.  When asked about headache, notes that he has some pain where his head struck the toilet.  Patient specifically denies dizziness, lightheadedness, chest pain, shortness of breath, any other symptoms, notes that he has otherwise been feeling well and denies recent illness.    PMHx and Psx; UTD per patinet  Social: + smoker.  No alcohol.  Retired - hotel management  Allergies UTD      The history is provided by the patient and a relative.         Review of External Medical Records:     Nursing Notes were reviewed.    REVIEW OF SYSTEMS    (2-9 systems for level 4, 10 or more for level 5)     Review of Systems   Skin:  Positive for color change.       Except as noted above the remainder of the review of systems was reviewed and negative.       PAST MEDICAL HISTORY     Past Medical History:   Diagnosis Date    Allergic rhinitis     Aortic calcification (HCC) 09/10/2021    [facility] CT scan : Abdominal Aortic and iliac arterial calcification     Arthritis 09/10/2021    [facility] A/P CT: disc space narrowing L5-S1. diffuse spine Osteophytes and facet hypertrophy.+scoliosis. C-spine CT: + degenerative changes     CAD (coronary artery disease)     MI January 2005. Angioplasty

## 2024-05-14 NOTE — ED NOTES
Gave discharge paperwork to pt. Pt wanted to leave before going over it with him. Daughter is a nurse and said that she could read it with him.

## 2024-08-23 ENCOUNTER — OFFICE VISIT (OUTPATIENT)
Age: 69
End: 2024-08-23

## 2024-08-23 ENCOUNTER — TELEPHONE (OUTPATIENT)
Age: 69
End: 2024-08-23

## 2024-08-23 VITALS
DIASTOLIC BLOOD PRESSURE: 76 MMHG | BODY MASS INDEX: 27.94 KG/M2 | HEART RATE: 74 BPM | TEMPERATURE: 97.1 F | HEIGHT: 65 IN | RESPIRATION RATE: 18 BRPM | SYSTOLIC BLOOD PRESSURE: 122 MMHG | WEIGHT: 167.7 LBS

## 2024-08-23 DIAGNOSIS — Q21.12 PFO (PATENT FORAMEN OVALE): ICD-10-CM

## 2024-08-23 DIAGNOSIS — Z86.73 HISTORY OF STROKE WITHOUT RESIDUAL DEFICITS: Primary | ICD-10-CM

## 2024-08-23 DIAGNOSIS — R41.3 MEMORY LOSS: ICD-10-CM

## 2024-08-23 NOTE — PROGRESS NOTES
NEUROLOGY CLINIC NOTE    Patient ID:  Yakov Pacheco  [id number]  69 y.o.  1955    Date of Consultation:  August 23, 2024    Referring Physician: MINESH Alexander IV, MD     Reason for Consultation:  history of stroke    Chief Complaint   Patient presents with    New Patient     Patient referred by PCP, Dr. Abraham Alexander. Patient is accompanied by his daughter who states patient has had multiple strokes, and 2 TBI. Daughter wants patient to be established with neurologist.       History of Present Illness:     Patient Active Problem List    Diagnosis Date Noted    PMR (polymyalgia rheumatica) ([facility]) 10/04/2017    CAD, multiple vessel 10/03/2017    Hypertension 10/03/2017    COPD (chronic obstructive pulmonary disease) ([facility]) 10/03/2017    Tobacco abuse disorder 10/03/2017    Subarachnoid hemorrhage ([facility]) 10/13/2023    Aortic calcification ([facility]) 09/10/2021    Impaired glucose tolerance     Allergic rhinitis 03/18/2018    IGT (impaired glucose tolerance) 01/18/2018    ED (erectile dysfunction) 10/03/2017    PE (physical exam), annual 10/03/2017    Fever blister 10/03/2017    History of CVA (cerebrovascular accident) 10/03/2017    History of kidney stones 10/03/2017    Vitamin D deficiency 10/03/2017    Dyslipidemia 09/18/2017     Past Medical History:   Diagnosis Date    Allergic rhinitis     Aortic calcification ([facility]) 09/10/2021    [facility] CT scan : Abdominal Aortic and iliac arterial calcification     Arthritis 09/10/2021    [facility] A[facility]P CT: disc space narrowing L5-S1. diffuse spine Osteophytes and facet hypertrophy.+scoliosis. C-spine CT: + degenerative changes     CAD (coronary artery disease)     MI January 2005. Angioplasty /stenting LAD. EF 45 % then. residual RCA stenosis at 70% and OM stenosis at 50%. Feb 2005 Stress Cardiolite neg. EF then 65%    Calculus of kidney     Carotid artery stenosis, asymptomatic, bilateral     9/10/2021 CTA Neck for Head trauma [facility] ER: right proximal internal Carotid artery 30%

## 2024-08-23 NOTE — PATIENT INSTRUCTIONS
Patient Education        Learning About How to Prevent a Stroke  What is a stroke?  A stroke is damage to the brain that occurs when a blood vessel in the brain bursts or is blocked by a blood clot. Without blood and the oxygen it carries, part of the brain starts to die. The part of the body controlled by the damaged area of the brain can't work properly.  Brain damage can start within minutes of a stroke. But quick treatment can help limit the damage and increase the chance of a full recovery.  What puts you at risk for stroke?  A risk factor is anything that makes you more likely to have a particular health problem.  Risk factors for stroke that you can manage or change include:  Health problems like atrial fibrillation, diabetes, high blood pressure, high cholesterol, hardening of the arteries (atherosclerosis), and sickle cell disease.  Smoking.  Drinking more than 2 alcoholic drinks a day for men and 1 drink a day for women.  Being overweight.  Not eating healthy foods.  Not getting enough physical activity.  Risk factors you can't change include:  Having a previous stroke.  Family history of stroke.  Being older.  Being , [place] Native, , or South  American.  Being female.  Having certain problems during pregnancy, such as preeclampsia.  Being past menopause.  Your doctor can help you know your risk. Then you and your doctor can talk about whether to take steps to lower it.  How can you help prevent a stroke?  Here are some things you can do to help prevent a stroke.  Manage health problems that raise your risk. These include atrial fibrillation, diabetes, high blood pressure, and high cholesterol.  Have a heart-healthy lifestyle.  Don't smoke. If you need help quitting, talk to your doctor about stop-smoking programs and medicines. These can increase your chances of quitting for good.  Limit alcohol to 2 drinks a day for men and 1 drink a day for women.  Stay at a healthy

## 2024-08-26 DIAGNOSIS — Q21.12 PFO (PATENT FORAMEN OVALE): Primary | ICD-10-CM

## 2024-09-05 ENCOUNTER — TELEPHONE (OUTPATIENT)
Age: 69
End: 2024-09-05

## 2024-09-05 NOTE — TELEPHONE ENCOUNTER
HIPAA Verified (if caller is someone other than patient): yes       Reason for Call: If calling to cancel, does patient want to reschedule? yes    Is this call related to results? no    If yes, please let patient know they must wait for their follow up / feedback appointment to discuss.  They can, however,  a copy of the results at the clinic 2-3 weeks after their testing appt.     Additional notes / reason for call:         Please advise callers that it could take up to 5 business days for a return call.          Message: (any additional details from patient/caller not covered above)  Patients daughter would like to schedule for Neuro physc. Testing due to Dementia / possible ADHD and depression.        Level 1 Calls - attempted to reach practice? N/A     Reason Call Marked High Priority (if applicable):   Patients daughter would like to schedule

## 2024-09-19 ENCOUNTER — TELEPHONE (OUTPATIENT)
Age: 69
End: 2024-09-19

## 2024-09-19 NOTE — TELEPHONE ENCOUNTER
Patient daughter requesting a call to discuss getting a order faxed for the Echo Bubble Test for her father.,    She needs to discuss the updated information she received from the Cardiologist    She stated talked with someone today and they need the order/diagnosis code and then they will call her to schedule the testing.    Fax number - ( Dr. Deras) [phone]

## 2024-09-20 NOTE — TELEPHONE ENCOUNTER
Dr HUGHES- Per your 8/23 office visist note \"Previous echocardiogram revealed presence of a PFO. Patient has an outside cardiologist. Advised daughter to confirm with clinic if more recent echocardiogram was done with bubble study and whether PFO was found or not. If this was not a bubble study then patient will need a echocardiogram with bubble study and if PFO is confirmed then a discussion will need to be made with regards to closure as a stroke risk. All questions and concerns were answered. \"    Pt is calling here for bubble study order?  Please advise.

## 2024-09-25 NOTE — TELEPHONE ENCOUNTER
Daughter advised- echo w/ bubble study and Dr HUGHES office note faxed to pt's cardiology, Dr Deras as requested

## 2024-10-08 ENCOUNTER — TELEPHONE (OUTPATIENT)
Age: 69
End: 2024-10-08

## 2024-10-08 NOTE — TELEPHONE ENCOUNTER
Patient's daughter requesting phone call to clarify appointment dates. I informed daughter of dates that were scheduled for 12/11/24, 12/13/24, and 1/29/25. Daughter states she thought Bertha told her dates for November and requested for Bertha to go over dates again.    Please contact

## 2024-12-10 ENCOUNTER — TELEPHONE (OUTPATIENT)
Age: 69
End: 2024-12-10

## 2024-12-10 NOTE — TELEPHONE ENCOUNTER
PSR called the patient's granddaughter and emergency contact who is HIPAA verified to ask if they want to cancel this appointment or not.    She stated she will call her grandfather to confirm what he wants to do and then call our office back.

## 2024-12-10 NOTE — TELEPHONE ENCOUNTER
PSR received a call from Odette Noel who states she takes the patient to all his appointments.    She states the patient and his family want to cancel his appointment for tomorrow.    She is not on his HIPAA so PSR cannot cancel the appointment.

## 2025-08-06 ENCOUNTER — HOSPITAL ENCOUNTER (OUTPATIENT)
Facility: HOSPITAL | Age: 70
Discharge: HOME | End: 2025-08-09
Attending: INTERNAL MEDICINE
Payer: MEDICARE

## 2025-08-06 DIAGNOSIS — R41.0 CONFUSION: ICD-10-CM

## 2025-08-06 DIAGNOSIS — G91.2 NPH (NORMAL PRESSURE HYDROCEPHALUS) (HCC): ICD-10-CM

## 2025-08-06 DIAGNOSIS — R05.1 ACUTE COUGH: ICD-10-CM

## 2025-08-06 DIAGNOSIS — Z79.899 ENCOUNTER FOR LONG-TERM (CURRENT) USE OF MEDICATIONS: ICD-10-CM

## 2025-08-06 DIAGNOSIS — R35.0 URINARY FREQUENCY: ICD-10-CM

## 2025-08-06 PROCEDURE — 70450 CT HEAD/BRAIN W/O DYE: CPT
